# Patient Record
Sex: MALE | Race: WHITE | Employment: FULL TIME | URBAN - METROPOLITAN AREA
[De-identification: names, ages, dates, MRNs, and addresses within clinical notes are randomized per-mention and may not be internally consistent; named-entity substitution may affect disease eponyms.]

---

## 2022-07-29 ENCOUNTER — HOSPITAL ENCOUNTER (INPATIENT)
Age: 52
LOS: 3 days | Discharge: HOME | DRG: 042 | End: 2022-08-01
Attending: EMERGENCY MEDICINE | Admitting: PSYCHIATRY & NEUROLOGY
Payer: COMMERCIAL

## 2022-07-29 ENCOUNTER — APPOINTMENT (OUTPATIENT)
Dept: CT IMAGING | Age: 52
End: 2022-07-29
Payer: COMMERCIAL

## 2022-07-29 ENCOUNTER — OFFICE VISIT (OUTPATIENT)
Dept: PRIMARY CARE CLINIC | Age: 52
End: 2022-07-29

## 2022-07-29 ENCOUNTER — APPOINTMENT (OUTPATIENT)
Dept: GENERAL RADIOLOGY | Age: 52
End: 2022-07-29
Payer: COMMERCIAL

## 2022-07-29 ENCOUNTER — HOSPITAL ENCOUNTER (EMERGENCY)
Age: 52
Discharge: ANOTHER HOSPITAL | End: 2022-07-29
Attending: EMERGENCY MEDICINE
Payer: COMMERCIAL

## 2022-07-29 VITALS
TEMPERATURE: 98.1 F | RESPIRATION RATE: 18 BRPM | DIASTOLIC BLOOD PRESSURE: 100 MMHG | SYSTOLIC BLOOD PRESSURE: 178 MMHG | OXYGEN SATURATION: 97 % | HEART RATE: 54 BPM

## 2022-07-29 VITALS
RESPIRATION RATE: 9 BRPM | SYSTOLIC BLOOD PRESSURE: 184 MMHG | HEART RATE: 53 BPM | DIASTOLIC BLOOD PRESSURE: 89 MMHG | OXYGEN SATURATION: 97 % | TEMPERATURE: 97.6 F

## 2022-07-29 DIAGNOSIS — R20.0 NUMBNESS: Primary | ICD-10-CM

## 2022-07-29 DIAGNOSIS — I63.9 CEREBROVASCULAR ACCIDENT (CVA), UNSPECIFIED MECHANISM (HCC): Primary | ICD-10-CM

## 2022-07-29 DIAGNOSIS — R53.1 ACUTE LEFT-SIDED WEAKNESS: ICD-10-CM

## 2022-07-29 DIAGNOSIS — R29.810 FACIAL DROOP: ICD-10-CM

## 2022-07-29 DIAGNOSIS — R13.10 DYSPHAGIA, UNSPECIFIED TYPE: Primary | ICD-10-CM

## 2022-07-29 PROBLEM — R29.90 STROKE-LIKE SYMPTOMS: Status: ACTIVE | Noted: 2022-07-29

## 2022-07-29 LAB
ABSOLUTE EOS #: 0.09 K/UL (ref 0–0.44)
ABSOLUTE IMMATURE GRANULOCYTE: 0.03 K/UL (ref 0–0.3)
ABSOLUTE LYMPH #: 2.17 K/UL (ref 1.1–3.7)
ABSOLUTE MONO #: 0.63 K/UL (ref 0.1–1.2)
ANION GAP SERPL CALCULATED.3IONS-SCNC: 11 MMOL/L (ref 9–17)
BASOPHILS # BLD: 1 % (ref 0–2)
BASOPHILS ABSOLUTE: 0.04 K/UL (ref 0–0.2)
BUN BLDV-MCNC: 9 MG/DL (ref 6–20)
BUN/CREAT BLD: 11 (ref 9–20)
CALCIUM SERPL-MCNC: 8.9 MG/DL (ref 8.6–10.4)
CHLORIDE BLD-SCNC: 101 MMOL/L (ref 98–107)
CO2: 24 MMOL/L (ref 20–31)
CREAT SERPL-MCNC: 0.82 MG/DL (ref 0.7–1.2)
EKG ATRIAL RATE: 49 BPM
EKG P AXIS: 62 DEGREES
EKG P-R INTERVAL: 184 MS
EKG Q-T INTERVAL: 456 MS
EKG QRS DURATION: 94 MS
EKG QTC CALCULATION (BAZETT): 411 MS
EKG R AXIS: 16 DEGREES
EKG T AXIS: 30 DEGREES
EKG VENTRICULAR RATE: 49 BPM
EOSINOPHILS RELATIVE PERCENT: 1 % (ref 1–4)
GFR AFRICAN AMERICAN: >60 ML/MIN
GFR NON-AFRICAN AMERICAN: >60 ML/MIN
GFR SERPL CREATININE-BSD FRML MDRD: ABNORMAL ML/MIN/{1.73_M2}
GLUCOSE BLD-MCNC: 140 MG/DL (ref 70–99)
HCT VFR BLD CALC: 41.1 % (ref 40.7–50.3)
HEMOGLOBIN: 14.5 G/DL (ref 13–17)
IMMATURE GRANULOCYTES: 0 %
LYMPHOCYTES # BLD: 28 % (ref 24–43)
MCH RBC QN AUTO: 31.6 PG (ref 25.2–33.5)
MCHC RBC AUTO-ENTMCNC: 35.3 G/DL (ref 25.2–33.5)
MCV RBC AUTO: 89.5 FL (ref 82.6–102.9)
MONOCYTES # BLD: 8 % (ref 3–12)
NRBC AUTOMATED: 0 PER 100 WBC
PDW BLD-RTO: 12.8 % (ref 11.8–14.4)
PLATELET # BLD: 256 K/UL (ref 138–453)
PMV BLD AUTO: 8.8 FL (ref 8.1–13.5)
POTASSIUM SERPL-SCNC: 3.8 MMOL/L (ref 3.7–5.3)
RBC # BLD: 4.59 M/UL (ref 4.21–5.77)
SARS-COV-2, RAPID: NOT DETECTED
SEG NEUTROPHILS: 62 % (ref 36–65)
SEGMENTED NEUTROPHILS ABSOLUTE COUNT: 4.74 K/UL (ref 1.5–8.1)
SODIUM BLD-SCNC: 136 MMOL/L (ref 135–144)
SPECIMEN DESCRIPTION: NORMAL
TROPONIN, HIGH SENSITIVITY: <6 NG/L (ref 0–22)
WBC # BLD: 7.7 K/UL (ref 3.5–11.3)

## 2022-07-29 PROCEDURE — 84484 ASSAY OF TROPONIN QUANT: CPT

## 2022-07-29 PROCEDURE — 80048 BASIC METABOLIC PNL TOTAL CA: CPT

## 2022-07-29 PROCEDURE — 6370000000 HC RX 637 (ALT 250 FOR IP): Performed by: EMERGENCY MEDICINE

## 2022-07-29 PROCEDURE — 99285 EMERGENCY DEPT VISIT HI MDM: CPT

## 2022-07-29 PROCEDURE — 70450 CT HEAD/BRAIN W/O DYE: CPT

## 2022-07-29 PROCEDURE — 71046 X-RAY EXAM CHEST 2 VIEWS: CPT

## 2022-07-29 PROCEDURE — 93005 ELECTROCARDIOGRAM TRACING: CPT | Performed by: EMERGENCY MEDICINE

## 2022-07-29 PROCEDURE — 85025 COMPLETE CBC W/AUTO DIFF WBC: CPT

## 2022-07-29 PROCEDURE — 99999 PR OFFICE/OUTPT VISIT,PROCEDURE ONLY: CPT | Performed by: FAMILY MEDICINE

## 2022-07-29 PROCEDURE — 87635 SARS-COV-2 COVID-19 AMP PRB: CPT

## 2022-07-29 PROCEDURE — 2060000000 HC ICU INTERMEDIATE R&B

## 2022-07-29 PROCEDURE — 6360000004 HC RX CONTRAST MEDICATION: Performed by: EMERGENCY MEDICINE

## 2022-07-29 PROCEDURE — 70498 CT ANGIOGRAPHY NECK: CPT

## 2022-07-29 RX ORDER — CLOPIDOGREL BISULFATE 75 MG/1
75 TABLET ORAL DAILY
Status: DISCONTINUED | OUTPATIENT
Start: 2022-07-30 | End: 2022-08-01 | Stop reason: HOSPADM

## 2022-07-29 RX ORDER — CLOPIDOGREL BISULFATE 75 MG/1
300 TABLET ORAL ONCE
Status: COMPLETED | OUTPATIENT
Start: 2022-07-29 | End: 2022-07-29

## 2022-07-29 RX ORDER — ASPIRIN 81 MG/1
324 TABLET, CHEWABLE ORAL ONCE
Status: COMPLETED | OUTPATIENT
Start: 2022-07-29 | End: 2022-07-29

## 2022-07-29 RX ORDER — METOPROLOL SUCCINATE 50 MG/1
50 TABLET, EXTENDED RELEASE ORAL DAILY
COMMUNITY

## 2022-07-29 RX ORDER — CLOPIDOGREL BISULFATE 75 MG/1
75 TABLET ORAL DAILY
Status: DISCONTINUED | OUTPATIENT
Start: 2022-07-30 | End: 2022-07-29

## 2022-07-29 RX ADMIN — CLOPIDOGREL BISULFATE 300 MG: 75 TABLET ORAL at 18:14

## 2022-07-29 RX ADMIN — IOPAMIDOL 75 ML: 755 INJECTION, SOLUTION INTRAVENOUS at 17:01

## 2022-07-29 RX ADMIN — ASPIRIN 81 MG 324 MG: 81 TABLET ORAL at 18:13

## 2022-07-29 ASSESSMENT — PATIENT HEALTH QUESTIONNAIRE - PHQ9
SUM OF ALL RESPONSES TO PHQ9 QUESTIONS 1 & 2: 0
SUM OF ALL RESPONSES TO PHQ QUESTIONS 1-9: 0
2. FEELING DOWN, DEPRESSED OR HOPELESS: 0
1. LITTLE INTEREST OR PLEASURE IN DOING THINGS: 0

## 2022-07-29 ASSESSMENT — ENCOUNTER SYMPTOMS
RHINORRHEA: 0
DIARRHEA: 0
NAUSEA: 0
PHOTOPHOBIA: 0
SHORTNESS OF BREATH: 0
ABDOMINAL PAIN: 0
ABDOMINAL PAIN: 0
SORE THROAT: 1
NAUSEA: 0
CONSTIPATION: 0
SHORTNESS OF BREATH: 0
TROUBLE SWALLOWING: 1
VOMITING: 0
BACK PAIN: 0
VOMITING: 0
COUGH: 0
DIARRHEA: 0

## 2022-07-29 ASSESSMENT — PAIN DESCRIPTION - DESCRIPTORS: DESCRIPTORS: SORE

## 2022-07-29 ASSESSMENT — PAIN DESCRIPTION - LOCATION: LOCATION: THROAT

## 2022-07-29 ASSESSMENT — PAIN - FUNCTIONAL ASSESSMENT: PAIN_FUNCTIONAL_ASSESSMENT: WONG-BAKER FACES

## 2022-07-29 ASSESSMENT — PAIN SCALES - GENERAL: PAINLEVEL_OUTOF10: 2

## 2022-07-29 NOTE — ED PROVIDER NOTES
[facility] ED  [address]  [place] [address]  Phone: [phone]        Pt Name: Elaina Alcaraz  MRN: [id number]  [place] 1970  Date of evaluation: 7/29/22      CHIEF COMPLAINT     Chief Complaint   Patient presents with    Numbness     Left leg numbness since last night. Difficulty walking. Facial Droop     Right sided facial droop \"squinted up more\" since last night. HISTORY OF PRESENT ILLNESS  (Location/Symptom, Timing/Onset, Context/Setting, Quality, Duration, Modifying Factors, Severity.)    Elaina Alcaraz is a 46 y.o. male who presents with left leg and arm numbness, sore throat, and difficulty swallowing. No weakness. No dysarthria or word-finding problems. Time of onset 5:00 PM last night. Extensive family history of stroke. REVIEW OF SYSTEMS    (2-9 systems for level 4, 10 or more for level 5)     Review of Systems   Constitutional:  Negative for chills and fever. HENT:  Positive for sore throat. Negative for congestion and rhinorrhea. Eyes:  Negative for photophobia and visual disturbance. Respiratory:  Negative for cough and shortness of breath. Cardiovascular:  Negative for chest pain and palpitations. Gastrointestinal:  Negative for abdominal pain, constipation, diarrhea, nausea and vomiting. Genitourinary:  Negative for dysuria, frequency and urgency. Musculoskeletal:  Negative for back pain and neck pain. Skin:  Negative for rash and wound. Neurological:  Negative for dizziness, light-headedness and headaches. Hematological:  Negative for adenopathy. Does not bruise/bleed easily. PAST MEDICAL HISTORY    has a past medical history of Hearing loss, Hypertension, and Sleep apnea. SURGICAL HISTORY      has a past surgical history that includes Tonsillectomy (2004) and hernia repair (1973).     CURRENTMEDICATIONS       Previous Medications    METOPROLOL SUCCINATE (TOPROL XL) 50 MG EXTENDED RELEASE TABLET    Take 50 mg by mouth in the morning. ALLERGIES     has No Known Allergies. FAMILY HISTORY     has no family status information on file. family history is not on file. SOCIAL HISTORY      reports that he has never smoked. He has never used smokeless tobacco. He reports current alcohol use of about 3.0 standard drinks per week. He reports that he does not use drugs. PHYSICAL EXAM    (up to 7 for level 4, 8 or more for level 5)   INITIAL VITALS:  tympanic temperature is 97.6 °F (36.4 °C). His blood pressure is 188/104 (abnormal) and his pulse is 49 (abnormal). His respiration is 15 and oxygen saturation is 99%. Physical Exam  Vitals and nursing note reviewed. Constitutional:       Appearance: Normal appearance. HENT:      Head: Normocephalic and atraumatic. Eyes:      Extraocular Movements: Extraocular movements intact. Pupils: Pupils are equal, round, and reactive to light. Cardiovascular:      Rate and Rhythm: Regular rhythm. Bradycardia present. Pulses: Normal pulses. Heart sounds: Normal heart sounds. No murmur heard. No friction rub. No gallop. Pulmonary:      Effort: Pulmonary effort is normal.      Breath sounds: Normal breath sounds. No wheezing, rhonchi or rales. Abdominal:      General: Abdomen is flat. Bowel sounds are normal.      Palpations: Abdomen is soft. Tenderness: There is no abdominal tenderness. There is no guarding or rebound. Musculoskeletal:         General: No tenderness. Normal range of motion. Cervical back: Normal range of motion and neck supple. Right lower leg: No edema. Left lower leg: No edema. Skin:     General: Skin is warm and dry. Capillary Refill: Capillary refill takes less than 2 seconds. Neurological:      Mental Status: He is alert and oriented to person, place, and time. Mental status is at baseline.    Psychiatric:         Mood and Affect: Mood normal.         Behavior: Behavior normal.       DIFFERENTIAL DIAGNOSIS/ MDM:     INITIAL NIH STROKE SCALE    Time Performed:  4:50 PM     1a. Level of consciousness:  0 - alert; keenly responsive  1b. Level of consciousness questions:  0 - answers both questions correctly  1c. Level of consciousness questions:  0 - performs both tasks correctly  2. Best Gaze:  0 - normal  3. Visual:  0 - no visual loss  4. Facial Palsy:  1 - minor paralysis (flattened nasolabial fold, asymmetric on smiling)  5a. Motor left arm:  0 - no drift, limb holds 90 (or 45) degrees for full 10 seconds  5b. Motor right arm:  0 - no drift, limb holds 90 (or 45) degrees for full 10 seconds  6a. Motor left le - no drift; leg holds 30 degree position for full 5 seconds  6b. Motor right le - no drift; leg holds 30 degree position for full 5 seconds  7. Limb Ataxia:  0 - absent  8. Sensory:  0 - normal; no sensory loss  9. Best Language:  0 - no aphasia, normal  10. Dysarthria:  0 - normal  11.   Extinction and Inattention:  0 - no abnormality    TOTAL:  1        DIAGNOSTIC RESULTS     EKG: All EKG's are interpreted by the Emergency Department Physician who either signs or Co-signs this chart in the absence of a cardiologist.    EKG Interpretation    Interpreted by emergency department physician    Rhythm: sinus bradycardia  Rate: bradycardia and 40-50  Axis: normal  Ectopy: none  Conduction: normal  ST Segments: normal  T Waves: normal  Q Waves: none    Clinical Impression: sinus bradycardia    Edy Mcarthur MD      RADIOLOGY:        Interpretation per the Radiologist below, if available at the time of this note:    CT HEAD WO CONTRAST    Result Date: 2022  EXAMINATION: CT OF THE HEAD WITHOUT CONTRAST; CTA OF THE HEAD AND NECK WITH CONTRAST 2022 4:56 pm; 2022 5:00 pm: TECHNIQUE: CT of the head was performed without the administration of intravenous contrast. Automated exposure control, iterative reconstruction, and/or weight based adjustment of the mA/kV was utilized to reduce the radiation dose to as low as reasonably achievable.; CTA of the head and neck was performed with the administration of intravenous contrast. Multiplanar reformatted images are provided for review. MIP images are provided for review. Stenosis of the internal carotid arteries measured using NASCET criteria. Automated exposure control, iterative reconstruction, and/or weight based adjustment of the mA/kV was utilized to reduce the radiation dose to as low as reasonably achievable. Noncontrast CT of the head with reconstructed 2-D images are also provided for review. COMPARISON: None. HISTORY: ORDERING SYSTEM PROVIDED HISTORY: left side numbness TECHNOLOGIST PROVIDED HISTORY: left side numbness Decision Support Exception - unselect if not a suspected or confirmed emergency medical condition->Emergency Medical Condition (MA) Reason for Exam: left side numbness; ORDERING SYSTEM PROVIDED HISTORY: left side numbness TECHNOLOGIST PROVIDED HISTORY: left side numbness Decision Support Exception - unselect if not a suspected or confirmed emergency medical condition->Emergency Medical Condition (MA) Reason for Exam: right facial droop, left side numbness FINDINGS: CT HEAD: BRAIN/VENTRICLES:  No acute intracranial hemorrhage or extraaxial fluid collection. Grey-white differentiation is maintained. No evidence of mass, mass effect or midline shift. No evidence of hydrocephalus. ORBITS: The visualized portion of the orbits demonstrate no acute abnormality. SINUSES:  The visualized paranasal sinuses and mastoid air cells demonstrate no acute abnormality. SOFT TISSUES/SKULL: No acute abnormality of the visualized skull or soft tissues. CTA NECK: AORTIC ARCH/ARCH VESSELS: No dissection or arterial injury. No significant stenosis of the brachiocephalic or subclavian arteries. CAROTID ARTERIES: No dissection, arterial injury, or hemodynamically significant stenosis by NASCET criteria.  VERTEBRAL ARTERIES: No dissection, arterial injury, or significant stenosis. SOFT TISSUES: Medialization of the right vocal cord could indicate paralysis. There is a mildly enlarged left submental lymph node measuring 13 x 10 mm. No primary mass is identified in the neck. BONES: No acute osseous abnormality. CTA HEAD: ANTERIOR CIRCULATION: No significant stenosis of the intracranial internal carotid, anterior cerebral, or middle cerebral arteries. No aneurysm. POSTERIOR CIRCULATION: No significant stenosis of the vertebral, basilar, or posterior cerebral arteries. No aneurysm. OTHER: No dural venous sinus thrombosis on this non-dedicated study. 1. No acute intracranial abnormality on noncontrast CT. 2. No large vessel occlusion in the head or neck. 3. Right vocal cord medialization could indicate paralysis. 4. Nonspecific mildly enlarged left submental lymph node. Critical results on the noncontrast CT were called by Dr. Jazzy Meyer MD to Gypsy Haley on 7/29/2022 at 17:18. CTA HEAD NECK W CONTRAST    Result Date: 7/29/2022  EXAMINATION: CT OF THE HEAD WITHOUT CONTRAST; CTA OF THE HEAD AND NECK WITH CONTRAST 7/29/2022 4:56 pm; 7/29/2022 5:00 pm: TECHNIQUE: CT of the head was performed without the administration of intravenous contrast. Automated exposure control, iterative reconstruction, and/or weight based adjustment of the mA/kV was utilized to reduce the radiation dose to as low as reasonably achievable.; CTA of the head and neck was performed with the administration of intravenous contrast. Multiplanar reformatted images are provided for review. MIP images are provided for review. Stenosis of the internal carotid arteries measured using NASCET criteria. Automated exposure control, iterative reconstruction, and/or weight based adjustment of the mA/kV was utilized to reduce the radiation dose to as low as reasonably achievable. Noncontrast CT of the head with reconstructed 2-D images are also provided for review. COMPARISON: None. HISTORY: ORDERING SYSTEM PROVIDED HISTORY: left side numbness TECHNOLOGIST PROVIDED HISTORY: left side numbness Decision Support Exception - unselect if not a suspected or confirmed emergency medical condition->Emergency Medical Condition (MA) Reason for Exam: left side numbness; ORDERING SYSTEM PROVIDED HISTORY: left side numbness TECHNOLOGIST PROVIDED HISTORY: left side numbness Decision Support Exception - unselect if not a suspected or confirmed emergency medical condition->Emergency Medical Condition (MA) Reason for Exam: right facial droop, left side numbness FINDINGS: CT HEAD: BRAIN/VENTRICLES:  No acute intracranial hemorrhage or extraaxial fluid collection. Grey-white differentiation is maintained. No evidence of mass, mass effect or midline shift. No evidence of hydrocephalus. ORBITS: The visualized portion of the orbits demonstrate no acute abnormality. SINUSES:  The visualized paranasal sinuses and mastoid air cells demonstrate no acute abnormality. SOFT TISSUES/SKULL: No acute abnormality of the visualized skull or soft tissues. CTA NECK: AORTIC ARCH/ARCH VESSELS: No dissection or arterial injury. No significant stenosis of the brachiocephalic or subclavian arteries. CAROTID ARTERIES: No dissection, arterial injury, or hemodynamically significant stenosis by NASCET criteria. VERTEBRAL ARTERIES: No dissection, arterial injury, or significant stenosis. SOFT TISSUES: Medialization of the right vocal cord could indicate paralysis. There is a mildly enlarged left submental lymph node measuring 13 x 10 mm. No primary mass is identified in the neck. BONES: No acute osseous abnormality. CTA HEAD: ANTERIOR CIRCULATION: No significant stenosis of the intracranial internal carotid, anterior cerebral, or middle cerebral arteries. No aneurysm. POSTERIOR CIRCULATION: No significant stenosis of the vertebral, basilar, or posterior cerebral arteries. No aneurysm.  OTHER: No dural venous sinus thrombosis on this non-dedicated study. 1. No acute intracranial abnormality on noncontrast CT. 2. No large vessel occlusion in the head or neck. 3. Right vocal cord medialization could indicate paralysis. 4. Nonspecific mildly enlarged left submental lymph node. Critical results on the noncontrast CT were called by Dr. Jaime Birmingham MD to Caity Neal on 7/29/2022 at 17:18. LABS:  Results for orders placed or performed during the hospital encounter of 07/29/22   COVID-19, Rapid    Specimen: Nasopharyngeal Swab   Result Value Ref Range    Specimen Description . NASOPHARYNGEAL SWAB     SARS-CoV-2, Rapid Not Detected Not Detected   Basic Metabolic Panel   Result Value Ref Range    Glucose 140 (H) 70 - 99 mg/dL    BUN 9 6 - 20 mg/dL    Creatinine 0.82 0.70 - 1.20 mg/dL    Bun/Cre Ratio 11 9 - 20    Calcium 8.9 8.6 - 10.4 mg/dL    Sodium 136 135 - 144 mmol/L    Potassium 3.8 3.7 - 5.3 mmol/L    Chloride 101 98 - 107 mmol/L    CO2 24 20 - 31 mmol/L    Anion Gap 11 9 - 17 mmol/L    GFR Non-African American >60 >60 mL/min    GFR African American >60 >60 mL/min    GFR Comment         CBC with Auto Differential   Result Value Ref Range    WBC 7.7 3.5 - 11.3 k/uL    RBC 4.59 4.21 - 5.77 m/uL    Hemoglobin 14.5 13.0 - 17.0 g/dL    Hematocrit 41.1 40.7 - 50.3 %    MCV 89.5 82.6 - 102.9 fL    MCH 31.6 25.2 - 33.5 pg    MCHC 35.3 (H) 25.2 - 33.5 g/dL    RDW 12.8 11.8 - 14.4 %    Platelets 604 137 - 166 k/uL    MPV 8.8 8.1 - 13.5 fL    NRBC Automated 0.0 0.0 per 100 WBC    Seg Neutrophils 62 36 - 65 %    Lymphocytes 28 24 - 43 %    Monocytes 8 3 - 12 %    Eosinophils % 1 1 - 4 %    Basophils 1 0 - 2 %    Immature Granulocytes 0 0 %    Segs Absolute 4.74 1.50 - 8.10 k/uL    Absolute Lymph # 2.17 1.10 - 3.70 k/uL    Absolute Mono # 0.63 0.10 - 1.20 k/uL    Absolute Eos # 0.09 0.00 - 0.44 k/uL    Basophils Absolute 0.04 0.00 - 0.20 k/uL    Absolute Immature Granulocyte 0.03 0.00 - 0.30 k/uL   Troponin   Result Value Ref Range    Troponin, High Sensitivity <6 0 - 22 ng/L       Negative troponin    EMERGENCY DEPARTMENT COURSE:   Vitals:    Vitals:    07/29/22 1643 07/29/22 1645 07/29/22 1721   BP: (!) 201/103 (!) 202/113 (!) 188/104   Pulse: 53  (!) 49   Resp: 16  15   Temp: 97.6 °F (36.4 °C)     TempSrc: Tympanic     SpO2: 98% 95% 99%     -------------------------  BP: (!) 188/104, Temp: 97.6 °F (36.4 °C), Heart Rate: (!) 49, Resp: 15      RE-EVALUATION:  Patient updated on test results and recommendations of Dr Katelyn Veras from Stroke Neurology from [facility]. Patient is agreeable to transfer. CONSULTS:  Stroke Neurology  I discussed the patient with Dr Katelyn Veras. No TPA as the patient is out of the window. Load with aspirin and plavix. Permissive hypertension with SBP <220. Transfer to [facility] for further evaluation. Concern for medullary stroke. PROCEDURES:  None    FINAL IMPRESSION      1. Cerebrovascular accident (CVA), unspecified mechanism ([address]          DISPOSITION/PLAN   DISPOSITION Decision To Transfer 07/29/2022 05:40:03 PM      CONDITION ON DISPOSITION:   Stable     PATIENT REFERRED TO:  No follow-up provider specified.     DISCHARGE MEDICATIONS:  New Prescriptions    No medications on file       (Please note that portions of this note were completed with a voicerecognition program.  Efforts were made to edit the dictations but occasionally words are mis-transcribed.)    Jake Dumont MD  Attending Emergency Medicine Physician        Hannah Mtz MD  07/29/22 62 [place] Won MD  08/01/22 [id number]

## 2022-07-29 NOTE — PROGRESS NOTES
Patient with left sided weakness and numbness and elevated blood pressure. Right upper eye droop. Checked in with complaint of sore throat, but after discussion, I don't think he has a sore throat as much as he reports difficulty with swallowing? Due to concern regarding possible stroke, I do think it is best to send patient to the ER for further evaluation and care. Patient taken to the ER via wheelchair after report called to the ER. ....

## 2022-07-30 ENCOUNTER — APPOINTMENT (OUTPATIENT)
Dept: MRI IMAGING | Age: 52
DRG: 042 | End: 2022-07-30
Payer: COMMERCIAL

## 2022-07-30 PROBLEM — R20.0 NUMBNESS: Status: ACTIVE | Noted: 2022-07-30

## 2022-07-30 PROBLEM — G46.4 LATERAL MEDULLARY SYNDROME: Status: ACTIVE | Noted: 2022-07-30

## 2022-07-30 PROBLEM — I63.541 OCCLUSION OF RIGHT POSTERIOR INFERIOR CEREBELLAR ARTERY WITH INFARCTION (HCC): Status: ACTIVE | Noted: 2022-07-30

## 2022-07-30 LAB
ANION GAP SERPL CALCULATED.3IONS-SCNC: 11 MMOL/L (ref 9–17)
BUN BLDV-MCNC: 7 MG/DL (ref 6–20)
CALCIUM SERPL-MCNC: 8.8 MG/DL (ref 8.6–10.4)
CHLORIDE BLD-SCNC: 103 MMOL/L (ref 98–107)
CHOLESTEROL/HDL RATIO: 2.7
CHOLESTEROL: 166 MG/DL
CO2: 23 MMOL/L (ref 20–31)
CREAT SERPL-MCNC: 0.83 MG/DL (ref 0.7–1.2)
GFR AFRICAN AMERICAN: >60 ML/MIN
GFR NON-AFRICAN AMERICAN: >60 ML/MIN
GFR SERPL CREATININE-BSD FRML MDRD: ABNORMAL ML/MIN/{1.73_M2}
GLUCOSE BLD-MCNC: 127 MG/DL (ref 70–99)
HCT VFR BLD CALC: 39.7 % (ref 40.7–50.3)
HDLC SERPL-MCNC: 62 MG/DL
HEMOGLOBIN: 13.8 G/DL (ref 13–17)
LDL CHOLESTEROL: 87 MG/DL (ref 0–130)
MCH RBC QN AUTO: 32.2 PG (ref 25.2–33.5)
MCHC RBC AUTO-ENTMCNC: 34.8 G/DL (ref 28.4–34.8)
MCV RBC AUTO: 92.5 FL (ref 82.6–102.9)
NRBC AUTOMATED: 0 PER 100 WBC
PDW BLD-RTO: 12.8 % (ref 11.8–14.4)
PLATELET # BLD: 238 K/UL (ref 138–453)
PMV BLD AUTO: 9 FL (ref 8.1–13.5)
POTASSIUM SERPL-SCNC: 3.6 MMOL/L (ref 3.7–5.3)
RBC # BLD: 4.29 M/UL (ref 4.21–5.77)
SODIUM BLD-SCNC: 137 MMOL/L (ref 135–144)
TRIGL SERPL-MCNC: 85 MG/DL
TSH SERPL DL<=0.05 MIU/L-ACNC: 1.75 UIU/ML (ref 0.3–5)
WBC # BLD: 8.8 K/UL (ref 3.5–11.3)

## 2022-07-30 PROCEDURE — 84443 ASSAY THYROID STIM HORMONE: CPT

## 2022-07-30 PROCEDURE — 6360000002 HC RX W HCPCS

## 2022-07-30 PROCEDURE — 92610 EVALUATE SWALLOWING FUNCTION: CPT

## 2022-07-30 PROCEDURE — 6370000000 HC RX 637 (ALT 250 FOR IP): Performed by: STUDENT IN AN ORGANIZED HEALTH CARE EDUCATION/TRAINING PROGRAM

## 2022-07-30 PROCEDURE — 99223 1ST HOSP IP/OBS HIGH 75: CPT | Performed by: PSYCHIATRY & NEUROLOGY

## 2022-07-30 PROCEDURE — 80061 LIPID PANEL: CPT

## 2022-07-30 PROCEDURE — 6370000000 HC RX 637 (ALT 250 FOR IP)

## 2022-07-30 PROCEDURE — 2060000000 HC ICU INTERMEDIATE R&B

## 2022-07-30 PROCEDURE — 36415 COLL VENOUS BLD VENIPUNCTURE: CPT

## 2022-07-30 PROCEDURE — 92523 SPEECH SOUND LANG COMPREHEN: CPT

## 2022-07-30 PROCEDURE — 80048 BASIC METABOLIC PNL TOTAL CA: CPT

## 2022-07-30 PROCEDURE — 6360000002 HC RX W HCPCS: Performed by: STUDENT IN AN ORGANIZED HEALTH CARE EDUCATION/TRAINING PROGRAM

## 2022-07-30 PROCEDURE — 83036 HEMOGLOBIN GLYCOSYLATED A1C: CPT

## 2022-07-30 PROCEDURE — 70551 MRI BRAIN STEM W/O DYE: CPT

## 2022-07-30 PROCEDURE — 85027 COMPLETE CBC AUTOMATED: CPT

## 2022-07-30 RX ORDER — LABETALOL HYDROCHLORIDE 5 MG/ML
10 INJECTION, SOLUTION INTRAVENOUS EVERY 10 MIN PRN
Status: DISCONTINUED | OUTPATIENT
Start: 2022-07-30 | End: 2022-08-01 | Stop reason: HOSPADM

## 2022-07-30 RX ORDER — ENOXAPARIN SODIUM 100 MG/ML
40 INJECTION SUBCUTANEOUS DAILY
Status: DISCONTINUED | OUTPATIENT
Start: 2022-07-30 | End: 2022-08-01 | Stop reason: HOSPADM

## 2022-07-30 RX ORDER — ONDANSETRON 2 MG/ML
4 INJECTION INTRAMUSCULAR; INTRAVENOUS EVERY 6 HOURS PRN
Status: DISCONTINUED | OUTPATIENT
Start: 2022-07-30 | End: 2022-08-01 | Stop reason: HOSPADM

## 2022-07-30 RX ORDER — POLYETHYLENE GLYCOL 3350 17 G/17G
17 POWDER, FOR SOLUTION ORAL DAILY PRN
Status: DISCONTINUED | OUTPATIENT
Start: 2022-07-30 | End: 2022-08-01 | Stop reason: HOSPADM

## 2022-07-30 RX ORDER — ATORVASTATIN CALCIUM 80 MG/1
80 TABLET, FILM COATED ORAL NIGHTLY
Status: DISCONTINUED | OUTPATIENT
Start: 2022-07-30 | End: 2022-08-01 | Stop reason: HOSPADM

## 2022-07-30 RX ORDER — ACETAMINOPHEN 325 MG/1
650 TABLET ORAL EVERY 4 HOURS PRN
Status: DISCONTINUED | OUTPATIENT
Start: 2022-07-30 | End: 2022-08-01 | Stop reason: HOSPADM

## 2022-07-30 RX ORDER — MAGNESIUM SULFATE IN WATER 40 MG/ML
2000 INJECTION, SOLUTION INTRAVENOUS ONCE
Status: COMPLETED | OUTPATIENT
Start: 2022-07-30 | End: 2022-07-30

## 2022-07-30 RX ORDER — ONDANSETRON 4 MG/1
4 TABLET, ORALLY DISINTEGRATING ORAL EVERY 8 HOURS PRN
Status: DISCONTINUED | OUTPATIENT
Start: 2022-07-30 | End: 2022-08-01 | Stop reason: HOSPADM

## 2022-07-30 RX ORDER — POTASSIUM CHLORIDE 7.45 MG/ML
10 INJECTION INTRAVENOUS
Status: DISCONTINUED | OUTPATIENT
Start: 2022-07-30 | End: 2022-07-30

## 2022-07-30 RX ORDER — ASPIRIN 300 MG/1
300 SUPPOSITORY RECTAL DAILY
Status: DISCONTINUED | OUTPATIENT
Start: 2022-07-30 | End: 2022-08-01 | Stop reason: HOSPADM

## 2022-07-30 RX ORDER — ASPIRIN 81 MG/1
81 TABLET ORAL DAILY
Status: DISCONTINUED | OUTPATIENT
Start: 2022-07-30 | End: 2022-08-01 | Stop reason: HOSPADM

## 2022-07-30 RX ORDER — POTASSIUM CHLORIDE 20 MEQ/1
40 TABLET, EXTENDED RELEASE ORAL ONCE
Status: COMPLETED | OUTPATIENT
Start: 2022-07-30 | End: 2022-07-30

## 2022-07-30 RX ADMIN — CLOPIDOGREL 75 MG: 75 TABLET, FILM COATED ORAL at 08:41

## 2022-07-30 RX ADMIN — POTASSIUM CHLORIDE 40 MEQ: 1500 TABLET, EXTENDED RELEASE ORAL at 08:42

## 2022-07-30 RX ADMIN — Medication 81 MG: at 08:41

## 2022-07-30 RX ADMIN — ATORVASTATIN CALCIUM 80 MG: 80 TABLET, FILM COATED ORAL at 21:36

## 2022-07-30 RX ADMIN — ATORVASTATIN CALCIUM 80 MG: 80 TABLET, FILM COATED ORAL at 03:04

## 2022-07-30 RX ADMIN — MAGNESIUM SULFATE HEPTAHYDRATE 2000 MG: 40 INJECTION, SOLUTION INTRAVENOUS at 18:51

## 2022-07-30 RX ADMIN — ACETAMINOPHEN 650 MG: 325 TABLET ORAL at 18:45

## 2022-07-30 RX ADMIN — ENOXAPARIN SODIUM 40 MG: 100 INJECTION SUBCUTANEOUS at 08:41

## 2022-07-30 ASSESSMENT — PAIN SCALES - GENERAL
PAINLEVEL_OUTOF10: 3
PAINLEVEL_OUTOF10: 0
PAINLEVEL_OUTOF10: 0

## 2022-07-30 ASSESSMENT — ENCOUNTER SYMPTOMS
CHOKING: 1
TROUBLE SWALLOWING: 1
SHORTNESS OF BREATH: 0

## 2022-07-30 ASSESSMENT — PAIN DESCRIPTION - LOCATION: LOCATION: HEAD

## 2022-07-30 NOTE — PROGRESS NOTES
Speech Language Pathology  Facility/Department: [facility] NEURO   CLINICAL BEDSIDE SWALLOW EVALUATION    NAME: Linwood Nevarez  : 1970  MRN: [id number]    ADMISSION DATE: 2022  ADMITTING DIAGNOSIS: has Stroke-like symptoms on their problem list.  ONSET DATE: 22    Recent Chest Xray/CT of Chest:   Impression   No acute cardiopulmonary disease       Date of Eval: 2022  Evaluating Therapist: HANG Radford    Current Diet level:  Current Diet : Regular    Primary Complaint   The patient is a 68-year-old male with past medical history of hypertension (on metoprolol 50 mg p.o. daily) who presents to the ED on 2022 as a transfer from [facility] (where he was initially referred to from an external urgent care facility) after presenting with complaints of acute onset dysphagia, slightly slurred speech, and left-sided numbness. Patient reports waking up around 2 AM due to choking on spit and subsequently having difficulty with swallowing water. He had previously gone to bed at 10:30 PM the night prior without any symptoms and at baseline. ST consulted as part of stroke workup. Pain:  Pain Assessment  Pain Assessment: None - Denies Pain  Pain Level: 0 (asleep resp even)    Reason for Referral  Linwood Nevarez was referred for a bedside swallow evaluation to assess the efficiency of his swallow function, identify signs and symptoms of aspiration and make recommendations regarding safe dietary consistencies, effective compensatory strategies, and safe eating environment. Impression  Oral mech exam and cranial nerve exam WFL. Vocal quality WFL. Mastication of cracker WFL , no significant residue , labial seal is adequate. Thin liquids tolerated well per cup and straw with timely swallow, without overt s/s of aspiration. Pureed textures tolerated well without overt s/s of aspiration. CXR is [facility]. Strong volitional cough.  Pt endorses having difficulties swallow Friday morning and

## 2022-07-30 NOTE — ED NOTES
45 yo [place] recent sore throat and difficulty swallowing, was not able to take his metoprolol pill, having right facial droop and left arm and leg weakness symptoms. Normal CT/CT angiogram.  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 Blood pressure initially 440O systolic, now 935.   Dr. Kylie Russell aware of patient and recommends loading with aspirin and Plavix, do not treat blood pressure unless over 220, transfer here for further evaluation  Accepted by Dr Lonny Black, RN  07/29/22 7002

## 2022-07-30 NOTE — PROGRESS NOTES
Speech Language Pathology  Facility/Department: [facility] NEURO  Initial Speech/Language/Cognitive Assessment    NAME: Julissa Tinajero  : 1970   MRN: [id number]  ADMISSION DATE: 2022  ADMITTING DIAGNOSIS: has Stroke-like symptoms on their problem list.  DATE ONSET: 22    Date of Eval: 2022   Evaluating Therapist: Catarina Krabbe, SLP    RECENT RESULTS  CT OF HEAD/MRI:  Impression   1. No acute intracranial abnormality on noncontrast CT. 2. No large vessel occlusion in the head or neck. 3. Right vocal cord medialization could indicate paralysis. 4. Nonspecific mildly enlarged left submental lymph node. Primary Complaint:   The patient is a 49-year-old male with past medical history of hypertension (on metoprolol 50 mg p.o. daily) who presents to the ED on 2022 as a transfer from [facility] (where he was initially referred to from an external urgent care facility) after presenting with complaints of acute onset dysphagia, slightly slurred speech, and left-sided numbness. Patient reports waking up around 2 AM due to choking on spit and subsequently having difficulty with swallowing water. He had previously gone to bed at 10:30 PM the night prior without any symptoms and at baseline. ST consulted as part of stroke workup. Pain:  Pain Assessment  Pain Assessment: None - Denies Pain  Pain Level: 0 (asleep resp even)    Assessment:      Diagnosis: Speech/language/cognition appear to be [facility]. Pt is able to express all wants/needs/ideas without difficulties. Divergent and convergent naming 100% independent, confrontational naming 100% independent. Follows directions 100% independently. Oriented x4. Able to recall recent events and biographical information. Completes verbal problem solving tasks 100% independently. Able to read the clock independently. He does report mild voice/speech deficits yesterday, however,this has since resolved.  Pt has no other concerns regarding speech/language/cognition. ST will sign off at this time. Recommendations:  Requires SLP Intervention: No         Subjective:  Pt Pleasant and cooperative. Sitting upright in bed upon ST arrival, just finished breakfast. He states his wife thought he was drunk last night because his voice/speech were abnormal, however, this has since resolved. Pt communicating well without any notable vocal deficits.      Vision  Vision: Within Functional Limits  Hearing  Hearing: Within functional limits      objective:     Oral/Motor  Oral Hygiene: Clean  Gag: No Impairment    Auditory Comprehension  Comprehension: Within Functional Limits    Expression  Primary Mode of Expression: Verbal    Verbal Expression  Verbal Expression: Within functional limits    Pragmatics/Social Functioning  Pragmatics: Within functional limits    Cognition:      Orientation  Overall Orientation Status: Within Normal Limits  Attention  Attention: Within Functional Limits  Memory  Memory: Within Functional Limits  Problem Solving  Problem Solving: Within Functional Limits  Numeric Reasoning  Numeric Reasoning: Within Functional Limits  Abstract Reasoning  Abstract Reasoning: Within Functional Limits  Safety/Judgment  Safety/Judgment: Within Functional Limits        Education:  Patient Education: Pt educated re speech/language/cognitive eval  Patient Education Response: Verbalizes understanding          Therapy Time:   Individual Concurrent Group Co-treatment   Time In 0927         Time Out 0936         Minutes 9            Timed Code Treatment Minutes: 9 Minutes    Catarina Krabbe CCC-SLP   Speech-Language Pathologist    7/30/2022 9:50 AM

## 2022-07-30 NOTE — ED PROVIDER NOTES
[facility]  ED  Emergency Department Encounter  Emergency Medicine Resident     Pt Name:Harley Gonzalez  MRN: [id number]  [place] 1970  Date of evaluation: 7/29/22  PCP:  No primary care provider on file. CHIEF COMPLAINT       No chief complaint on file. HISTORY OF PRESENT ILLNESS  (Location/Symptom, Timing/Onset, Context/Setting, Quality, Duration, Modifying Factors, Severity.)      Lori Vail is a 46 y.o. male who presents with difficulty swallowing. Past medical history for hearing loss, hypertension and sleep apnea. Patient states that yesterday night around 2 AM he woke and felt he had difficulty swallowing as well as left-sided numbness. Went to an urgent care today that recommend he go to the emergency department. Outlying facility they noticed a right-sided facial droop. Concern for acute CVA. CT scans negative. Patient transferred here for higher level of care as well as MRI and neurology consultation. Paramedics noticed a right constricted pupil compared to the left that is more dilated. Patient denies any eye pain. States never had a stroke before in the past.  Denies any one-sided weakness or slurred speech. It was noted that he had some voice change according to him and CT scan noted right vocal cord paralysis. Denies any tearing from the right eye, or sweating. PAST MEDICAL / SURGICAL / SOCIAL / FAMILY HISTORY      has a past medical history of Hearing loss, Hypertension, and Sleep apnea. has a past surgical history that includes Tonsillectomy (2004) and hernia repair (1973).       Social History     Socioeconomic History    Marital status:      Spouse name: Not on file    Number of children: Not on file    Years of education: Not on file    Highest education level: Not on file   Occupational History    Not on file   Tobacco Use    Smoking status: Never    Smokeless tobacco: Never   Vaping Use    Vaping Use: Never used   Substance and Sexual Activity    Alcohol use: Yes     Alcohol/week: 3.0 standard drinks     Types: 3 Glasses of wine per week    Drug use: Never    Sexual activity: Not on file   Other Topics Concern    Not on file   Social History Narrative    Not on file     Social Determinants of Health     Financial Resource Strain: Not on file   Food Insecurity: Not on file   Transportation Needs: Not on file   Physical Activity: Not on file   Stress: Not on file   Social Connections: Not on file   Intimate Partner Violence: Not on file   Housing Stability: Not on file       No family history on file. Allergies:  Patient has no known allergies. Home Medications:  Prior to Admission medications    Medication Sig Start Date End Date Taking? Authorizing Provider   metoprolol succinate (TOPROL XL) 50 MG extended release tablet Take 50 mg by mouth in the morning. Historical Provider, MD       REVIEW OF SYSTEMS    (2-9 systems for level 4, 10 or more for level 5)      Review of Systems   Constitutional:  Negative for chills and fever. HENT:  Positive for trouble swallowing. Negative for congestion and nosebleeds. Eyes:  Negative for visual disturbance. Respiratory:  Negative for shortness of breath. Cardiovascular:  Negative for chest pain. Gastrointestinal:  Negative for abdominal pain, diarrhea, nausea and vomiting. Musculoskeletal:  Negative for neck pain. Skin:  Negative for rash and wound. Neurological:  Positive for facial asymmetry and numbness. Negative for dizziness, seizures, syncope, speech difficulty, light-headedness and headaches. PHYSICAL EXAM   (up to 7 for level 4, 8 or more for level 5)      INITIAL VITALS:   BP (!) 193/106   Pulse 52   Temp 98.6 °F (37 °C) (Oral)   Resp 16   SpO2 98%     Physical Exam  Constitutional:       General: He is not in acute distress. Appearance: He is not ill-appearing, toxic-appearing or diaphoretic.    Eyes:      Extraocular Movements: Extraocular movements intact. Cardiovascular:      Rate and Rhythm: Regular rhythm. Bradycardia present. Heart sounds: No murmur heard. No friction rub. No gallop. Pulmonary:      Effort: No respiratory distress. Breath sounds: No stridor. No wheezing, rhonchi or rales. Chest:      Chest wall: No tenderness. Abdominal:      General: There is no distension. Palpations: There is no mass. Tenderness: There is no abdominal tenderness. There is no guarding or rebound. Hernia: No hernia is present. Musculoskeletal:         General: No swelling, tenderness or signs of injury. Skin:     Capillary Refill: Capillary refill takes less than 2 seconds. Coloration: Skin is not jaundiced or pale. Findings: No bruising, erythema, lesion or rash. Neurological:      Mental Status: He is oriented to person, place, and time. Cranial Nerves: Cranial nerve deficit present. Sensory: Sensory deficit present. Motor: No weakness. Coordination: Coordination normal.      Comments: Right-sided facial droop, left-sided numbness, right pupil is constricted compared to left, both equal and reactive to light, extraocular movements intact       DIFFERENTIAL  DIAGNOSIS     PLAN (LABS / IMAGING / EKG):  Orders Placed This Encounter   Procedures    Inpatient consult to Neurology       MEDICATIONS ORDERED:  No orders of the defined types were placed in this encounter. DDX: CVA, electrolyte abnormality, Jake syndrome, arrhythmia    DIAGNOSTIC RESULTS / EMERGENCY DEPARTMENT COURSE / MDM   LAB RESULTS:  No results found for this visit on 07/29/22. IMPRESSION: Laboratory testing performed at outlying facility was grossly unremarkable. No indication for repeat labs. RADIOLOGY:  No orders to display         EMERGENCY DEPARTMENT COURSE:  54-year-old male presents chief complaint of left-sided numbness as well as difficulty swallowing. Symptoms began approximately 2 AM last night.   CT CTA as well as labs performed [facility]. These were negative. Stroke alert was called at [facility] and neuro team from here evaluated electronically. They recommended transfer for higher level of care. On examination patient does have right-sided facial droop as well as left-sided numbness. Pupils are not equal but are reactive to light. Extraocular moods intact. Neurology consulted when patient arrived in emergency department. Patient is hypertensive but neurology wants to keep him on the higher side and do not treat blood pressure in the systolics greater than 848. Neurology will admit patient for further evaluation. No notes of EC Admission Criteria type on file. PROCEDURES:  N/a    CONSULTS:  IP CONSULT TO NEUROLOGY    CRITICAL CARE:  N/a         FINAL IMPRESSION      1. Numbness          DISPOSITION / PLAN     DISPOSITION Decision To Admit 07/29/2022 10:59:32 PM      PATIENT REFERRED TO:  No follow-up provider specified.     DISCHARGE MEDICATIONS:  New Prescriptions    No medications on file       Kaveh Chiang DO  Emergency Medicine Resident    (Please note that portions of thisnote were completed with a voice recognition program.  Efforts were made to edit the dictations but occasionally words are mis-transcribed.)        Braden Moreno DO  Resident  07/29/22 [id number]

## 2022-07-30 NOTE — CARE COORDINATION
07/30/22 0852   Service Assessment   Patient Orientation Alert and Oriented;Person;Place;Situation   Cognition Alert   History Provided By Child/Family; Spouse   Primary Caregiver Self   Support Systems Spouse/Significant Other;Children   Patient's Healthcare Decision Maker is: Patient Declined (Legal Next of Kin Remains as Decision Maker)   PCP Verified by CM Yes  ([facility] [place])   Last Visit to PCP Within last 3 months   Prior Functional Level Independent in ADLs/IADLs   Current Functional Level Independent in ADLs/IADLs   Can patient return to prior living arrangement Yes   Ability to make needs known: Good   Family able to assist with home care needs: No   Would you like for me to discuss the discharge plan with any other family members/significant others, and if so, who? Yes  Kai Gucci)   Social/Functional History   Lives With Spouse;Daughter; Son   Type of [address] One level   Bathroom Shower/Tub Tub/Shower unit   Bathroom Toilet Standard   Bathroom Accessibility Accessible   Receives Help From Family;Friend(s)   ADL Assistance Independent   Homemaking Assistance Independent   Homemaking Responsibilities No   Transfer Assistance Independent   Active  Yes   Mode of Transportation Car   Occupation Full time employment   Discharge Planning   Type of [address] Prior To Admission None   Potential Assistance Needed N/A   DME Ordered? No   Potential Assistance Purchasing Medications No   Patient expects to be discharged to: House   One/Two Story Residence One story   History of falls? 0   Services At/After Discharge   Transition of Care Consult (CM Consult) Discharge Planning   Services At/After Discharge None   Confirm Follow Up Transport Family   Condition of Participation: Discharge Planning   The Plan for Transition of Care is related to the following treatment goals:  To find out what is wrong and

## 2022-07-30 NOTE — PLAN OF CARE
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 Patient was seen and examined at bedside today on 7/30/2022. His symptoms have significantly improved, and patient is eating, and has no other focal deficits noted. Patient does have a strong family history of stroke, however does not have any prior history of heart attack or stroke. Patient is already on aspirin 81 mg, Plavix 75 mg, Lipitor 80 mg daily. Plan to continue management with PT/OT/SP. Cardiology was consulted for evaluation for cardiac embolic source, and possible event monitor implementation prior to discharge.

## 2022-07-30 NOTE — ED PROVIDER NOTES
[facility]  Emergency Department  Faculty Attestation     I performed a history and physical examination of the patient and discussed management with the resident. I reviewed the residents note and agree with the documented findings and plan of care. Any areas of disagreement are noted on the chart. I was personally present for the key portions of any procedures. I have documented in the chart those procedures where I was not present during the key portions. I have reviewed the emergency nurses triage note. I agree with the chief complaint, past medical history, past surgical history, allergies, medications, social and family history as documented unless otherwise noted below. For Physician Assistant/ Nurse Practitioner cases/documentation I have personally evaluated this patient and have completed at least one if not all key elements of the E/M (history, physical exam, and MDM). Additional findings are as noted. Primary Care Physician:  No primary care provider on file. Screenings:  [unfilled]    CHIEF COMPLAINT     No chief complaint on file. RECENT VITALS:    ,   ,  ,      LABS:  Labs Reviewed - No data to display    Radiology  No orders to display       CRITICAL CARE: There was a high probability of clinically significant/life threatening deterioration in this patient's condition which required my urgent intervention. Total critical care time was none minutes. This excludes any time for separately reportable procedures. 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 He has been having difficulty swallowing, subtle change in his voice, tingling feeling in his left palm and his left foot and right facial droop that is subtle. There are some right facial discomfort. No headache. No palpitations. No neck pain. No trauma. No vertigo or ataxia.   EMS now notes that his left pupil is larger than the right pupil. No diplopia or visual changes. CT brain was normal.  CT angiogram was negative. Of note they did find that his right vocal cord appeared abnormal/not functioning on the CT images. He had difficulty swallowing but is able to tolerate his saliva and that he was able to swallow the aspirin and Plavix. No history of diabetes. Dr Jeevan Whitney neuro interventional reviewed the case and recommends aspirin and Plavix and transferred here for further evaluation and care, recommends treatment of hypertension only if over 220. Patient does have bradycardia which is from his medications. On exam he appears comfortable, GCS is 15, slightly hypertensive and also bradycardic. No respiratory distress. GCS is 15. Left pupil is larger than the right, both reactive. Extraocular moods are full. There is very subtle right sided weakness at the nasolabial fold and with asymmetric smile. He is able to lift eyebrows and squeeze eyes symmetrically. No change in skin of the forehead. Neck is supple and full range movement. Normal motor strength. Normal finger-nose movements. Patient does have vitiligo. 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 Plan is neurology consultation. Anticipate MRI and admission. Rosalind Story.  Donna Betancourt MD, [facility]  Attending Emergency  Physician               Mishel Carolina MD  07/29/22 [id number]

## 2022-07-30 NOTE — H&P
[facility] Neurology   [address] [place] [address]    HISTORY AND PHYSICAL EXAMINATION            Date:   7/29/2022  Patient name:  Yamilex Parham  Date of admission:  7/29/2022  9:49 PM  MRN:   [id number]  Account:  [de-identified]  YOB: 1970  PCP:    No primary care provider on file. Room:   [facility]  Code Status:    No Order    Chief Complaint:     Dysphagia, dysarthria, left sided numbness    History Obtained From:     patient, electronic medical record, verbal sign out from tele-stroke attending Dr. Elizabeth Hart    History of Present Illness: The patient is a 30-year-old male with past medical history of hypertension (on metoprolol 50 mg p.o. daily) who presents to the ED on 7/29/2022 as a transfer from [facility] (where he was initially referred to from an external urgent care facility) after presenting with complaints of acute onset dysphagia, slightly slurred speech, and left-sided numbness. Patient reports waking up around 2 AM due to choking on spit and subsequently having difficulty with swallowing water. He had previously gone to bed at 10:30 PM the night prior without any symptoms and at baseline. He reports medication compliance but admits he missed antihypertensive medication dose on Tuesday due to traveling and also did not take meds on day of presentation due to dysphagia. Patient was not on any antiplatelets, anticoagulation, or statins at home. He has no personal history of stroke but mother, maternal grandmother, and maternal uncle all had strokes. No known personal or family history of bleeding or hypercoagulable disorders. Describes left arm and leg numbness as having limbs \"fall asleep. \"   Also complains of abnormal \"heavy\" sensation of right side of face including heaviness of right eyelid and periorbital region. Admits to mild headache that he says he is not concerned about.   Denies any visual disturbance or drooling and denies any similar symptoms in past.    Prior to arrival, patient had telestroke consult;   CT head and CTA head and neck were done and remarkable for right vocal cord medialization that could indicate paralysis but otherwise negative for any acute intracranial abnormality and negative for any LVO. Patient was loaded with Aspirin & Plavix and recommended transfer to [facility] for stroke work-up. On initial presentation in ED here at [facility], pt  had /106, HR 52 (pt reports baseline pulse in 40s and 50s), vital signs otherwise unremarkable. Initial BS of 140. CBC and BMP unremarkable and negative COVID test.     He is a non-smoker. Past Medical History:     Past Medical History:   Diagnosis Date    Hearing loss     Hypertension     Sleep apnea         Past Surgical History:     Past Surgical History:   Procedure Laterality Date    HERNIA REPAIR  1973    TONSILLECTOMY  2004        Medications Prior to Admission:     Prior to Admission medications    Medication Sig Start Date End Date Taking? Authorizing Provider   metoprolol succinate (TOPROL XL) 50 MG extended release tablet Take 50 mg by mouth in the morning. Historical Provider, MD        Allergies:     Patient has no known allergies. Social History:     Tobacco:    reports that he has never smoked. He has never used smokeless tobacco.  Alcohol:      reports current alcohol use of about 3.0 standard drinks per week. Drug Use:  reports no history of drug use. Family History:     No family history on file. Review of Systems:     Review of Systems   HENT:  Positive for tinnitus (chronic) and trouble swallowing. Negative for hearing loss. Eyes:  Negative for visual disturbance. Respiratory:  Positive for choking. Negative for shortness of breath. Cardiovascular:  Negative for chest pain. Musculoskeletal:  Negative for myalgias. Neurological:  Positive for speech difficulty, numbness and headaches. Negative for seizures and weakness. Psychiatric/Behavioral:  Negative for confusion. Physical Exam:   BP (!) 183/101   Pulse 52   Temp 98.6 °F (37 °C) (Oral)   Resp 16   SpO2 96%   Temp (24hrs), Av.1 °F (36.7 °C), Min:97.6 °F (36.4 °C), Max:98.6 °F (37 °C)    No results for input(s): POCGLU in the last 72 hours. No intake or output data in the 24 hours ending [id number]       General Examination    General Resting comfortably in bed   Head Normocephalic, without obvious abnormality   Eyes Left pupil markedly larger than right, both symmetrical in shape and reactivity to light despite size difference. Subtle right sided ptosis. Neck Good ROM   Lungs Respirations unlabored   Extremities No cyanosis or edema or warmth. Mental status  Speech Alert. Oriented to person, place, and time. Speech is fluent without paraphasic errors but subtle dysarthria present  Good repetition and naming  Can do 1 step, 2 step, and cross-body commands  Language appropriate. No hallucinations or delusions. No SI/HI. Cranial nerves   II - VFF, visual threat intact  III, IV, VI - extra-ocular muscle movement intact, nystagmus  V - sensation symmetric         VII -  Mild flattening of right NLF  VIII - intact hearing to conversational tone          IX, X - symmetrical palate elevation   XI - 5/5 strength symmetric  XII - tongue midline without any notable fasciculation but weakness on tongue protrusion noted   Motor function  Strength: grossly 5/5 b/I in all extremities  Bulk: grossly normal no atrophy  Tone: symmetric b/l arms and legs  Abnormal movements: No abnormal movements or tremor   Sensory function Loss of pinprick sensation of LUE, LLE, and left side of face at V2 and V3 distribution. Decreased sensation to temperature at LUE and LLE. Sensation to touch intact and symmetrical bilaterally in face and all limbs.     Cerebellar No dysmetria. + Romberg   Gait                  Not assessed     INITIAL NIH STROKE SCALE:     Time Performed: 4:45 PM   Result Value Ref Range    Glucose 140 (H) 70 - 99 mg/dL    BUN 9 6 - 20 mg/dL    Creatinine 0.82 0.70 - 1.20 mg/dL    Bun/Cre Ratio 11 9 - 20    Calcium 8.9 8.6 - 10.4 mg/dL    Sodium 136 135 - 144 mmol/L    Potassium 3.8 3.7 - 5.3 mmol/L    Chloride 101 98 - 107 mmol/L    CO2 24 20 - 31 mmol/L    Anion Gap 11 9 - 17 mmol/L    GFR Non-African American >60 >60 mL/min    GFR African American >60 >60 mL/min    GFR Comment         CBC with Auto Differential    Collection Time: 07/29/22  4:45 PM   Result Value Ref Range    WBC 7.7 3.5 - 11.3 k/uL    RBC 4.59 4.21 - 5.77 m/uL    Hemoglobin 14.5 13.0 - 17.0 g/dL    Hematocrit 41.1 40.7 - 50.3 %    MCV 89.5 82.6 - 102.9 fL    MCH 31.6 25.2 - 33.5 pg    MCHC 35.3 (H) 25.2 - 33.5 g/dL    RDW 12.8 11.8 - 14.4 %    Platelets 141 926 - 835 k/uL    MPV 8.8 8.1 - 13.5 fL    NRBC Automated 0.0 0.0 per 100 WBC    Seg Neutrophils 62 36 - 65 %    Lymphocytes 28 24 - 43 %    Monocytes 8 3 - 12 %    Eosinophils % 1 1 - 4 %    Basophils 1 0 - 2 %    Immature Granulocytes 0 0 %    Segs Absolute 4.74 1.50 - 8.10 k/uL    Absolute Lymph # 2.17 1.10 - 3.70 k/uL    Absolute Mono # 0.63 0.10 - 1.20 k/uL    Absolute Eos # 0.09 0.00 - 0.44 k/uL    Basophils Absolute 0.04 0.00 - 0.20 k/uL    Absolute Immature Granulocyte 0.03 0.00 - 0.30 k/uL   Troponin    Collection Time: 07/29/22  4:45 PM   Result Value Ref Range    Troponin, High Sensitivity <6 0 - 22 ng/L   COVID-19, Rapid    Collection Time: 07/29/22  5:18 PM    Specimen: Nasopharyngeal Swab   Result Value Ref Range    Specimen Description . NASOPHARYNGEAL SWAB     SARS-CoV-2, Rapid Not Detected Not Detected   EKG 12 Lead    Collection Time: 07/29/22  5:18 PM   Result Value Ref Range    Ventricular Rate 49 BPM    Atrial Rate 49 BPM    P-R Interval 184 ms    QRS Duration 94 ms    Q-T Interval 456 ms    QTc Calculation (Bazett) 411 ms    P Axis 62 degrees    R Axis 16 degrees    T Axis 30 degrees       Imaging/Diagnostics:  XR CHEST (2 Exam: right facial droop, left side numbness FINDINGS: CT HEAD: BRAIN/VENTRICLES:  No acute intracranial hemorrhage or extraaxial fluid collection. Grey-white differentiation is maintained. No evidence of mass, mass effect or midline shift. No evidence of hydrocephalus. ORBITS: The visualized portion of the orbits demonstrate no acute abnormality. SINUSES:  The visualized paranasal sinuses and mastoid air cells demonstrate no acute abnormality. SOFT TISSUES/SKULL: No acute abnormality of the visualized skull or soft tissues. CTA NECK: AORTIC ARCH/ARCH VESSELS: No dissection or arterial injury. No significant stenosis of the brachiocephalic or subclavian arteries. CAROTID ARTERIES: No dissection, arterial injury, or hemodynamically significant stenosis by NASCET criteria. VERTEBRAL ARTERIES: No dissection, arterial injury, or significant stenosis. SOFT TISSUES: Medialization of the right vocal cord could indicate paralysis. There is a mildly enlarged left submental lymph node measuring 13 x 10 mm. No primary mass is identified in the neck. BONES: No acute osseous abnormality. CTA HEAD: ANTERIOR CIRCULATION: No significant stenosis of the intracranial internal carotid, anterior cerebral, or middle cerebral arteries. No aneurysm. POSTERIOR CIRCULATION: No significant stenosis of the vertebral, basilar, or posterior cerebral arteries. No aneurysm. OTHER: No dural venous sinus thrombosis on this non-dedicated study. 1. No acute intracranial abnormality on noncontrast CT. 2. No large vessel occlusion in the head or neck. 3. Right vocal cord medialization could indicate paralysis. 4. Nonspecific mildly enlarged left submental lymph node. Critical results on the noncontrast CT were called by Dr. Shital Lowe MD to Chelsi Cornejo on 7/29/2022 at 17:18.      CTA HEAD NECK W CONTRAST    Result Date: 7/29/2022  EXAMINATION: CT OF THE HEAD WITHOUT CONTRAST; CTA OF THE HEAD AND NECK WITH CONTRAST 7/29/2022 4:56 pm; 7/29/2022 5:00 pm: TECHNIQUE: CT of the head was performed without the administration of intravenous contrast. Automated exposure control, iterative reconstruction, and/or weight based adjustment of the mA/kV was utilized to reduce the radiation dose to as low as reasonably achievable.; CTA of the head and neck was performed with the administration of intravenous contrast. Multiplanar reformatted images are provided for review. MIP images are provided for review. Stenosis of the internal carotid arteries measured using NASCET criteria. Automated exposure control, iterative reconstruction, and/or weight based adjustment of the mA/kV was utilized to reduce the radiation dose to as low as reasonably achievable. Noncontrast CT of the head with reconstructed 2-D images are also provided for review. COMPARISON: None. HISTORY: ORDERING SYSTEM PROVIDED HISTORY: left side numbness TECHNOLOGIST PROVIDED HISTORY: left side numbness Decision Support Exception - unselect if not a suspected or confirmed emergency medical condition->Emergency Medical Condition (MA) Reason for Exam: left side numbness; ORDERING SYSTEM PROVIDED HISTORY: left side numbness TECHNOLOGIST PROVIDED HISTORY: left side numbness Decision Support Exception - unselect if not a suspected or confirmed emergency medical condition->Emergency Medical Condition (MA) Reason for Exam: right facial droop, left side numbness FINDINGS: CT HEAD: BRAIN/VENTRICLES:  No acute intracranial hemorrhage or extraaxial fluid collection. Grey-white differentiation is maintained. No evidence of mass, mass effect or midline shift. No evidence of hydrocephalus. ORBITS: The visualized portion of the orbits demonstrate no acute abnormality. SINUSES:  The visualized paranasal sinuses and mastoid air cells demonstrate no acute abnormality. SOFT TISSUES/SKULL: No acute abnormality of the visualized skull or soft tissues.  CTA NECK: AORTIC ARCH/ARCH VESSELS: No dissection or arterial injury. No significant stenosis of the brachiocephalic or subclavian arteries. CAROTID ARTERIES: No dissection, arterial injury, or hemodynamically significant stenosis by NASCET criteria. VERTEBRAL ARTERIES: No dissection, arterial injury, or significant stenosis. SOFT TISSUES: Medialization of the right vocal cord could indicate paralysis. There is a mildly enlarged left submental lymph node measuring 13 x 10 mm. No primary mass is identified in the neck. BONES: No acute osseous abnormality. CTA HEAD: ANTERIOR CIRCULATION: No significant stenosis of the intracranial internal carotid, anterior cerebral, or middle cerebral arteries. No aneurysm. POSTERIOR CIRCULATION: No significant stenosis of the vertebral, basilar, or posterior cerebral arteries. No aneurysm. OTHER: No dural venous sinus thrombosis on this non-dedicated study. 1. No acute intracranial abnormality on noncontrast CT. 2. No large vessel occlusion in the head or neck. 3. Right vocal cord medialization could indicate paralysis. 4. Nonspecific mildly enlarged left submental lymph node. Critical results on the noncontrast CT were called by Dr. Jazzy Meyer MD to Gypsy Haley on 7/29/2022 at 17:18. Assessment & Differential Dx:      Primary Problem  Stroke-like symptoms    Active Hospital Problems    Diagnosis Date Noted    Stroke-like symptoms [R29.90] 07/29/2022     Priority: Medium       Case of a 66-year-old male, non-smoker, with past medical history of hypertension and family history of strokes who presents as transfer from [facility] with acute onset dysphagia, left-sided numbness, and dysarthria. LKW 10:30 PM of day prior to presentation. Initial /106, , NIH 3.  Physical exam remarkable for unequal but reactive pupils (left larger than right), loss of pinprick sensation to left upper and left lower extremity and left face at V2 and V3 distribution, decreased sensation to temperature at left upper and left lower extremity, mild right nasolabial fold flattening, some weakness with tongue protrusion, and mild dysarthria. Prior to arrival, patient had had telestroke consult, CT head and CTA head/neck done, and was loaded with Aspirin and Plavix at [facility]. No tPA given. Symptoms create a clinical picture of stroke and we will admit patient for work-up. Suspect stroke  Presentation: right miosis and ptosis, left facial sensory loss (pinprick) at V2/V3 distribution, left sided sensory loss (temperature + pinprick) at LUE & LLE, mild dysarthria, weakness in tongue protrusion. No motor deficits. LKW 10:30 PM on 7/28/22  Risk factors: HTN, family hx of stroke. Denies any smoking or diabetes hx. CTA head and neck: remarkable for right vocal cord medialization which could be indicative of paralysis. No LVO. CT head: No acute intracranial abnormality.   Already loaded with ASA & Plavix at other hospital  ASA 81 mg daily  Plavix 75 mg daily x21 days  Atorvastatin 80 mg nightly  Permissive hypertension, target SBP less than 220  MRI brain without contrast  Echo with bubble study  Swallow evaluation  PT/OT/SLP  Hemoglobin A1c, fasting lipid panel, TSH        Electronically signed by   Sofy Logan DO  7/29/2022  11:45 PM

## 2022-07-31 LAB
ANION GAP SERPL CALCULATED.3IONS-SCNC: 9 MMOL/L (ref 9–17)
BUN BLDV-MCNC: 12 MG/DL (ref 6–20)
CALCIUM SERPL-MCNC: 9.1 MG/DL (ref 8.6–10.4)
CHLORIDE BLD-SCNC: 105 MMOL/L (ref 98–107)
CO2: 27 MMOL/L (ref 20–31)
CREAT SERPL-MCNC: 0.89 MG/DL (ref 0.7–1.2)
ESTIMATED AVERAGE GLUCOSE: 103 MG/DL
GFR AFRICAN AMERICAN: >60 ML/MIN
GFR NON-AFRICAN AMERICAN: >60 ML/MIN
GFR SERPL CREATININE-BSD FRML MDRD: ABNORMAL ML/MIN/{1.73_M2}
GLUCOSE BLD-MCNC: 119 MG/DL (ref 70–99)
HBA1C MFR BLD: 5.2 % (ref 4–6)
POTASSIUM SERPL-SCNC: 4.3 MMOL/L (ref 3.7–5.3)
SODIUM BLD-SCNC: 141 MMOL/L (ref 135–144)

## 2022-07-31 PROCEDURE — 36415 COLL VENOUS BLD VENIPUNCTURE: CPT

## 2022-07-31 PROCEDURE — 97530 THERAPEUTIC ACTIVITIES: CPT

## 2022-07-31 PROCEDURE — 80048 BASIC METABOLIC PNL TOTAL CA: CPT

## 2022-07-31 PROCEDURE — 97161 PT EVAL LOW COMPLEX 20 MIN: CPT

## 2022-07-31 PROCEDURE — 6360000002 HC RX W HCPCS

## 2022-07-31 PROCEDURE — 2060000000 HC ICU INTERMEDIATE R&B

## 2022-07-31 PROCEDURE — 1200000000 HC SEMI PRIVATE

## 2022-07-31 PROCEDURE — 6370000000 HC RX 637 (ALT 250 FOR IP)

## 2022-07-31 RX ADMIN — CLOPIDOGREL 75 MG: 75 TABLET, FILM COATED ORAL at 09:32

## 2022-07-31 RX ADMIN — ATORVASTATIN CALCIUM 80 MG: 80 TABLET, FILM COATED ORAL at 21:38

## 2022-07-31 RX ADMIN — ENOXAPARIN SODIUM 40 MG: 100 INJECTION SUBCUTANEOUS at 09:31

## 2022-07-31 RX ADMIN — Medication 81 MG: at 09:32

## 2022-07-31 ASSESSMENT — ENCOUNTER SYMPTOMS
ABDOMINAL PAIN: 0
SHORTNESS OF BREATH: 0
NAUSEA: 0
DIARRHEA: 0
COLOR CHANGE: 0
VOMITING: 0
PHOTOPHOBIA: 0
TROUBLE SWALLOWING: 0
WHEEZING: 0
VOICE CHANGE: 0
CONSTIPATION: 0
CHEST TIGHTNESS: 0
STRIDOR: 0

## 2022-07-31 ASSESSMENT — PAIN SCALES - GENERAL
PAINLEVEL_OUTOF10: 0
PAINLEVEL_OUTOF10: 0

## 2022-07-31 NOTE — PROGRESS NOTES
Occupational [facility]  Occupational Therapy Not Seen Note    DATE: 2022    NAME: Elaina Alcaraz  MRN: [id number]   : 1970      Patient not seen this date for Occupational Therapy due to:    Patient independent with ADLs and functional tasks with no acute OT needs. Will defer OT evaluation at this time. Please reorder OT if future needs arise.      Next Scheduled Treatment: N/A    Electronically signed by Ignacio Castellanos OT on 2022 at 3:41 PM

## 2022-07-31 NOTE — PROGRESS NOTES
[id number] [facility] Neurology   [address] [place] [address]    Progress Note             Date:   7/31/2022  Patient name:  Julissa Tinajero  Date of admission:  7/29/2022  9:49 PM  MRN:   [id number]  Account:  [de-identified]  YOB: 1970  PCP:    No primary care provider on file. Room:   [address] [place] [address]  Code Status:    Full Code    Chief Complaint:     Acute onset dysphagia and slurred speech    Interval hx:     Patient was seen and examined at bedside today. He is able to eat appropriately, without evidence of dysphagia. Speech therapy saw him and stated he does not require any further therapy. PT/OT to follow. Patient stated he had a slight headache overnight which was managed with Tylenol, however denies any present headaches when seen and examined. He also denies visual disturbances, changes in voice, slurred speech, noticeable facial weakness, paresthesias, focal motor deficits, or loss of balance or lack of coordination. He denies chest pain, shortness of breath, abdominal pain, fevers or chills. Brief History of Present Illness:     Catia Ortez is a 49-year-old male with past medical history of hypertension on metoprolol 50 mg p.o. daily who presented to [facility] ED on 7/29/2022 for acute onset dysphagia, slightly slurred speech, left-sided numbness. He was a transfer from [facility], where he initially arrived from an external urgent care facility. Patient stated he initially woke up around 2 AM on 7/29/2022 with difficulty swallowing and choking on saliva. He subsequently experienced difficulty with swallowing water. Patient went to sleep around 10:30 PM the night prior without any symptoms at baseline. He reports medication compliance with metoprolol, however he missed a dose on Tuesday due to traveling and also did not take medications on Friday (day of presentation) due to dysphagia.     Initially patient described left arm and leg numbness, and describes them as falling asleep. Also complains of abnormal \"heavy\" sensation on the right side of the face including heaviness of right eyelid and periorbital region. CT head without IV contrast and CTA of head and neck were done on admission and were positive for right vocal cord medialization that could indicate paralysis but otherwise negative for any acute intracranial abnormality including LVO, stenosis, or large territorial infarction. Patient was loaded with aspirin and Plavix at [facility] prior to admission to [facility]. Subsequent MRI brain without IV contrast done at [facility] revealed right lateral medullary infarction. Initial presentation, patient had elevated blood pressure 193/106, heart rate 52 (patient reports baseline pulses 40s and 50s), vital signs otherwise unremarkable. Initial blood sugar of 140. CBC and BMP unremarkable and negative COVID test on admission. Patient states he is a non-smoker. Past Medical History:     Past Medical History:   Diagnosis Date    Hearing loss     Hypertension     Sleep apnea         Past Surgical History:     Past Surgical History:   Procedure Laterality Date    [address] [place]    TONSILLECTOMY  2004        Medications Prior to Admission:     Prior to Admission medications    Medication Sig Start Date End Date Taking? Authorizing Provider   metoprolol succinate (TOPROL XL) 50 MG extended release tablet Take 50 mg by mouth in the morning. Historical Provider, MD        Allergies:     Patient has no known allergies. Social History:     Tobacco:    reports that he has never smoked. He has never used smokeless tobacco.  Alcohol:      reports current alcohol use of about 3.0 standard drinks per week. Drug Use:  reports no history of drug use. Family History:     No family history on file. Review of Systems:     Review of Systems   Constitutional:  Negative for chills and fever.    HENT:  Negative for good strength of grade 5/5 in proximal and distal muscle groups   LUE: Significant for good strength of grade 5/5 in proximal and distal muscle groups   RLE: Significant for good strength of grade 5/5 in proximal and distal muscle groups   LLE: Significant for good strength of grade 5/5 in proximal and distal muscle groups      Normal bulk, normal tone and no involuntary movements, no tremor   SENSORY FUNCTION:  Normal touch, normal pinprick, normal vibration, normal proprioception   CEREBELLAR FUNCTION:  Intact fine motor control over upper limbs and lower limbs   REFLEX FUNCTION:  Symmetric in upper and lower extremities, no Babinski sign   STATION and GAIT Deferred       Investigations:      Laboratory Testing:  No results found for this or any previous visit (from the past 24 hour(s)). 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   Patient is now returning back to baseline, and has minimal difficulty with speech or eating. No other focal deficits noted. Lipid panel reveals cholesterol of 166, HDL 62, LDL 87, triglycerides 85. Hemoglobin A1c of 5.2. Plan:       Continue aspirin 81 mg, Plavix 75 mg, and Lipitor 80 mg nightly. Obtain JENNIFER, likely tomorrow. Lipids within normal limits, LDL slightly elevated from goal of < 70. Currently on Statin. Lovenox for DVT prophylaxis. Follow-up further recommendations after discussing the case with attending  The plan was discussed with the patient, patient's family and the medical staff. Consultations:   IP CONSULT TO NEUROLOGY  IP CONSULT TO CARDIOLOGY    Patient is admitted as inpatient status because of co-morbidities listed above, severity of signs and symptoms as outlined, requirement for current medical therapies and most importantly because of direct risk to patient if care not provided in a hospital setting. Cali Hassan DO  7/31/2022  9:08 AM    Copy sent to Dr. Ramirez primary care provider on file.

## 2022-07-31 NOTE — ED NOTES
Patient Health Questionnaire-9 (PHQ-9)    Over the last 2 weeks, how often have you been bothered by any of the following problems? 1. Little Interest or pleasure in doing things? [x] Not at all  [] Several Days  [] More than half the day  []  Nearly every day    2. Feeling down, depressed or hopeless? [x] Not at all  [] Several Days  [] More than half the day  []  Nearly every day    3. Trouble falling or staying asleep, or sleeping too much? [] Not at all  [x] Several Days  [] More than half the day  []  Nearly every day    4. Feeling tired or having little energy? [] Not at all  [x] Several Days  [] More than half the day  []  Nearly every day    5. Poor apettite or overeating? [x] Not at all  [] Several Days  [] More than half the day  []  Nearly every day    6. Feeling bad about yourself-or that you are a failure or have let yourself or your family down? [x] Not at all  [] Several Days  [] More than half the day  []  Nearly every day    7. Trouble concentrating on things, such as reading the newspaper or watching television? [x] Not at all  [] Several Days  [] More than half the day  []  Nearly every day    8. Moving or speaking so slowly that other people could have noticed? Or the opposite-being so fidgety or restless that you have been moving around a lot more than usual?   [x] Not at all  [] Several Days  [] More than half the day  []  Nearly every day    9. Thoughts that you would be better off dead or of hurting yourself in some way? [x] Not at all  [] Several Days  [] More than half the day  []  Nearly every day    Total Score: 2    If you checked off any problems, how difficult have these problems made it for you to do your work, take care of things at home, or get along with other people? [x] Not difficult at all  [] Somewhat Difficult  [] Very Difficult  []  Extremely Difficult     Patient denied any concerns at this time.      K[address], [facility]  07/31/22 8831

## 2022-07-31 NOTE — PROGRESS NOTES
Patients blood pressure has been elevated. Neurology resident bedside. States okay for elevated BP. Will normalize the BP in 24 hours, due to findings on MRI.

## 2022-07-31 NOTE — PROGRESS NOTES
Physical Therapy  Facility/Department: [facility] NEURO  Physical Therapy Initial Assessment    Name: Lynnette Hurtado  : 1970  MRN: [id number]  Date of Service: 2022    History copied and pasted from H+P:  The patient developed sudden onset difficulty in swallowing along with difficulty in speaking and left-sided numbness with difficulty in ambulation and coordination. The patient noticed the symptoms upon waking up from sleep, yesterday. The patient presented to the emergency department at a local hospital in [place]. He was eventually transferred to [facility] ER for further evaluation and, was admitted for stroke work-up. The patient was noted to have mild flattening of the right nasolabial fold, subtle difficulty in speaking-dysarthria, modest weakness in the protrusion of the tongue, and loss of light touch, pinprick sensation in the left upper and lower extremities. The patient has a baseline history of hypertension and a strong maternal side-family history of stroke. Discharge Recommendations:  No further therapy required at discharge. PT Equipment Recommendations  Equipment Needed: No      Patient Diagnosis(es): The encounter diagnosis was Numbness. Past Medical History:  has a past medical history of Hearing loss, Hypertension, and Sleep apnea. Past Surgical History:  has a past surgical history that includes Tonsillectomy () and hernia repair (). Assessment   Pt cooperative, motivated, c/o mild tingling L 3rd-5th fingers and L foot ankle distal, but states it's improving. He's currently functioning independently, able to ambulate without device w/o LOB, independent on stairs.   Continued PT not necessary  Therapy Prognosis: Good  Decision Making: Low Complexity  No Skilled PT: Independent with functional mobility   Requires PT Follow-Up: No  Activity Tolerance  Activity Tolerance: Patient tolerated treatment well     Plan   Plan  Plan: Discharge with evaluation only  Safety Devices  Type of Devices: Call light within reach, Left in chair  Restraints  Restraints Initially in Place: No     Restrictions  Restrictions/Precautions  Restrictions/Precautions: Up as Tolerated  Required Braces or Orthoses?: No     Subjective   General  Patient assessed for rehabilitation services?: Yes  Response To Previous Treatment: Not applicable  Family / Caregiver Present: No  Follows Commands: Within Functional Limits  Subjective  Subjective: denies pain         Social/Functional History  Social/Functional History  Lives With: Spouse, Daughter, Son  Type of Home: House  Home Layout: One level  Home Access: Stairs to enter without rails (can go in house with 1 HR)  Entrance Stairs - Number of Steps: 2  Bathroom Shower/Tub: Tub/Shower unit  Bathroom Toilet: Standard  Bathroom Accessibility: Accessible  Has the patient had two or more falls in the past year or any fall with injury in the past year?: No  Receives Help From: Family, Friend(s)  ADL Assistance: Independent  Homemaking Assistance: Independent  Homemaking Responsibilities: No  Ambulation Assistance: Independent  Transfer Assistance: Independent  Active : Yes  Mode of Transportation: Car  Occupation: Full time employment  Type of Occupation:   Vision/Hearing  Vision  Vision: Within Functional Limits  Hearing  Hearing: Within functional limits    Cognition   Orientation  Overall Orientation Status: Within Normal Limits  Cognition  Overall Cognitive Status: WNL     Objective   Heart Rate: 68  Heart Rate Source: Monitor  BP: (!) 155/100  188/119 at beginning of session, retaken immediately, 171/102; discussed with RN, she stated permissive hypertension per neurology.     BP Location: Left upper arm  BP Method: Automatic  Patient Position: Semi fowlers  MAP (Calculated): 118.33  Resp: 18  SpO2: 95 %  O2 Device: None (Room air)     Observation/Palpation  Posture: Good        AROM RLE (degrees)  RLE AROM: WFL  AROM LLE (degrees)  LLE AROM : WFL  AROM RUE (degrees)  RUE AROM : WFL  AROM LUE (degrees)  LUE AROM : WFL  Strength RLE  Strength RLE: WNL  Strength LLE  Strength LLE: WNL  Strength RUE  Strength RUE: WNL  Strength LUE  Strength LUE: WNL           Bed mobility  Rolling to Right: Independent  Supine to Sit: Independent  Scooting: Independent  Transfers  Sit to Stand: Independent  Stand to sit:  Independent  Bed to Chair: Independent  Stand Pivot Transfers: Independent  Ambulation  Surface: level tile  Device: No Device  Assistance: Independent  Gait Deviations: None  Distance: 230'x1  Stairs/Curb  Stairs?: Yes  Stairs  # Steps : 10  Rails: Right ascending  Device: No Device  Assistance: Independent     Balance  Posture: Good  Sitting - Static: Good  Sitting - Dynamic: Good  Standing - Static: Good  Standing - Dynamic: Good    AM-PAC Score  AM-PAC Inpatient Mobility Raw Score : 24 (07/31/22 1035)  AM-PAC Inpatient T-Scale Score : 61.14 (07/31/22 1035)  Mobility Inpatient CMS 0-100% Score: 0 (07/31/22 1035)  Mobility Inpatient CMS G-Code Modifier : CH (07/31/22 1035)      Goals  Short Term Goals  Time Frame for Short term goals: 1 visit  Short term goal 1: evaluate and give recommendations: pt is independent; continued PT not necessary  Patient Goals   Patient goals : go home       Education  Patient Education  Education Given To: Patient  Education Provided: Role of Therapy;Plan of Care  Education Method: Verbal  Barriers to Learning: None  Education Outcome: Verbalized understanding      Therapy Time   Individual Concurrent Group Co-treatment   Time In 0945         Time Out 1030         Minutes 45         Timed Code Treatment Minutes: [facility]       Sebastián Galloway PT

## 2022-08-01 ENCOUNTER — APPOINTMENT (OUTPATIENT)
Dept: CARDIAC CATH/INVASIVE PROCEDURES | Age: 52
DRG: 042 | End: 2022-08-01
Payer: COMMERCIAL

## 2022-08-01 VITALS
RESPIRATION RATE: 14 BRPM | TEMPERATURE: 97.2 F | BODY MASS INDEX: 31.61 KG/M2 | HEIGHT: 69 IN | HEART RATE: 66 BPM | DIASTOLIC BLOOD PRESSURE: 100 MMHG | WEIGHT: 213.41 LBS | OXYGEN SATURATION: 96 % | SYSTOLIC BLOOD PRESSURE: 171 MMHG

## 2022-08-01 LAB
ANION GAP SERPL CALCULATED.3IONS-SCNC: 13 MMOL/L (ref 9–17)
BUN BLDV-MCNC: 12 MG/DL (ref 6–20)
CALCIUM SERPL-MCNC: 9.1 MG/DL (ref 8.6–10.4)
CHLORIDE BLD-SCNC: 103 MMOL/L (ref 98–107)
CO2: 22 MMOL/L (ref 20–31)
CREAT SERPL-MCNC: 0.86 MG/DL (ref 0.7–1.2)
GFR AFRICAN AMERICAN: >60 ML/MIN
GFR NON-AFRICAN AMERICAN: >60 ML/MIN
GFR SERPL CREATININE-BSD FRML MDRD: ABNORMAL ML/MIN/{1.73_M2}
GLUCOSE BLD-MCNC: 107 MG/DL (ref 70–99)
HCT VFR BLD CALC: 44.4 % (ref 40.7–50.3)
HEMOGLOBIN: 14.9 G/DL (ref 13–17)
LV EF: 55 %
LVEF MODALITY: NORMAL
MCH RBC QN AUTO: 31.1 PG (ref 25.2–33.5)
MCHC RBC AUTO-ENTMCNC: 33.6 G/DL (ref 28.4–34.8)
MCV RBC AUTO: 92.7 FL (ref 82.6–102.9)
NRBC AUTOMATED: 0 PER 100 WBC
PDW BLD-RTO: 12.8 % (ref 11.8–14.4)
PLATELET # BLD: 240 K/UL (ref 138–453)
PMV BLD AUTO: 8.9 FL (ref 8.1–13.5)
POTASSIUM SERPL-SCNC: 3.7 MMOL/L (ref 3.7–5.3)
RBC # BLD: 4.79 M/UL (ref 4.21–5.77)
SODIUM BLD-SCNC: 138 MMOL/L (ref 135–144)
WBC # BLD: 8.7 K/UL (ref 3.5–11.3)

## 2022-08-01 PROCEDURE — 93306 TTE W/DOPPLER COMPLETE: CPT

## 2022-08-01 PROCEDURE — 99233 SBSQ HOSP IP/OBS HIGH 50: CPT | Performed by: PSYCHIATRY & NEUROLOGY

## 2022-08-01 PROCEDURE — 36415 COLL VENOUS BLD VENIPUNCTURE: CPT

## 2022-08-01 PROCEDURE — 2709999900 HC NON-CHARGEABLE SUPPLY

## 2022-08-01 PROCEDURE — 6360000002 HC RX W HCPCS

## 2022-08-01 PROCEDURE — 2500000003 HC RX 250 WO HCPCS

## 2022-08-01 PROCEDURE — 85027 COMPLETE CBC AUTOMATED: CPT

## 2022-08-01 PROCEDURE — 0JH602Z INSERTION OF MONITORING DEVICE INTO CHEST SUBCUTANEOUS TISSUE AND FASCIA, OPEN APPROACH: ICD-10-PCS | Performed by: INTERNAL MEDICINE

## 2022-08-01 PROCEDURE — C1764 EVENT RECORDER, CARDIAC: HCPCS

## 2022-08-01 PROCEDURE — 6370000000 HC RX 637 (ALT 250 FOR IP)

## 2022-08-01 PROCEDURE — 80048 BASIC METABOLIC PNL TOTAL CA: CPT

## 2022-08-01 PROCEDURE — 33285 INSJ SUBQ CAR RHYTHM MNTR: CPT

## 2022-08-01 RX ORDER — HYDROCHLOROTHIAZIDE 25 MG/1
25 TABLET ORAL DAILY
Status: DISCONTINUED | OUTPATIENT
Start: 2022-08-01 | End: 2022-08-01 | Stop reason: HOSPADM

## 2022-08-01 RX ORDER — METOPROLOL SUCCINATE 50 MG/1
50 TABLET, EXTENDED RELEASE ORAL DAILY
Status: DISCONTINUED | OUTPATIENT
Start: 2022-08-01 | End: 2022-08-01 | Stop reason: HOSPADM

## 2022-08-01 RX ORDER — ASPIRIN 81 MG/1
81 TABLET ORAL DAILY
Qty: 30 TABLET | Refills: 3 | Status: SHIPPED | OUTPATIENT
Start: 2022-08-02

## 2022-08-01 RX ORDER — HYDROCHLOROTHIAZIDE 25 MG/1
25 TABLET ORAL DAILY
Qty: 30 TABLET | Refills: 3 | Status: SHIPPED | OUTPATIENT
Start: 2022-08-02

## 2022-08-01 RX ORDER — CLOPIDOGREL BISULFATE 75 MG/1
75 TABLET ORAL DAILY
Qty: 30 TABLET | Refills: 3 | Status: SHIPPED | OUTPATIENT
Start: 2022-08-02 | End: 2022-08-21

## 2022-08-01 RX ORDER — ATORVASTATIN CALCIUM 80 MG/1
80 TABLET, FILM COATED ORAL NIGHTLY
Qty: 30 TABLET | Refills: 3 | Status: SHIPPED | OUTPATIENT
Start: 2022-08-01

## 2022-08-01 RX ADMIN — Medication 81 MG: at 15:53

## 2022-08-01 RX ADMIN — HYDROCHLOROTHIAZIDE 25 MG: 25 TABLET ORAL at 17:08

## 2022-08-01 RX ADMIN — METOPROLOL SUCCINATE 50 MG: 50 TABLET, FILM COATED, EXTENDED RELEASE ORAL at 17:08

## 2022-08-01 ASSESSMENT — ENCOUNTER SYMPTOMS
NAUSEA: 0
BACK PAIN: 0
APNEA: 0
ABDOMINAL PAIN: 0
DIARRHEA: 0
VOMITING: 0
RHINORRHEA: 0
CONSTIPATION: 0
SORE THROAT: 0
SHORTNESS OF BREATH: 0
STRIDOR: 0
WHEEZING: 0

## 2022-08-01 NOTE — PLAN OF CARE
Problem: Discharge Planning  Goal: Discharge to home or other facility with appropriate resources  8/1/2022 1803 by Emma Paredes RN  Outcome: Completed     Problem: Pain  Goal: Verbalizes/displays adequate comfort level or baseline comfort level  8/1/2022 1803 by Emma Paredes RN  Outcome: Completed     Problem: Chronic Conditions and Co-morbidities  Goal: Patient's chronic conditions and co-morbidity symptoms are monitored and maintained or improved  8/1/2022 1803 by Emma Paredes RN  Outcome: Completed     Problem: ABCDS Injury Assessment  Goal: Absence of physical injury  8/1/2022 1803 by Emma Paredes RN  Outcome: Completed   Electronically signed by Mary Starr RN on 8/1/2022 at 6:04 PM

## 2022-08-01 NOTE — PROGRESS NOTES
Pt returned from JENNIFER/loop recorder. Med tronic at bedside educating pt.   Electronically signed by Maribeth Escalera RN on 8/1/2022 at 2:39 PM

## 2022-08-01 NOTE — PROGRESS NOTES
Patient called out, stating that he would like to leave. /105. Page sent to primary team    Electronically signed by Maribeth Escalera RN on 8/1/2022 at 3:33 PM    Dr. Dana Pond restarted patient's home medications for blood pressure.    Electronically signed by Maribeth Escalera RN on 8/1/2022 at 3:59 PM

## 2022-08-01 NOTE — PROGRESS NOTES
Bedside swallow evaluation performed, gag reflex present. Patient has not difficulty with ice chips and drinking water.    Electronically signed by Sergio Betancourt RN on 8/1/2022 at 3:27 PM

## 2022-08-01 NOTE — DISCHARGE INSTRUCTIONS
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 You were also found to have hypertensive emergency. You have been started on aspirin Plavix and atorvastatin. We also resumed your home metoprolol and added HCTZ for management of your blood pressure. A loop recorder was placed to monitor your heart for any problems with your heart rhythm  Please take your medications as prescribed  Please call and make an appointment with your cardiologist  Please call and follow-up with the neurologist in 1 week  Please monitor your blood pressure at home daily and call your PCP if BP > 160  Please return if you have any worsening of your symptoms. Please keep watch for signs of bleeding and easy bruising history of being placed on antiplatelet medications which puts you at high risk for bleeding. Taking Aspirin and Other Antiplatelets Safely: Care Instructions  Your Care Instructions     Aspirin and other antiplatelet medicines help prevent blood clots from forming. They can help some people lower their risk of a heart attack or stroke. But these medicines can also make you more likely to bleed. That's why it's important to talk to your doctor before you start taking aspirin every day. It's not right for everyone. And if you and your doctor decide these medicinesare right for you, learn how to take them safely. If you take aspirin, be sure you know how to take it. Your doctor can tell you what dose to take and how often to take it. One low-dose aspirin is 81 milligrams (mg). But the dose for daily aspirin can range from 81 mg to 325 mg. If you take another antiplatelet, take it as prescribed. Follow-up care is a key part of your treatment and safety. Be sure to make and go to all appointments, and call your doctor if you are having problems. It's also a good idea to know your test results and keep alist of the medicines you take. How can you care for yourself at home?   Before you start to take daily aspirin or some other antiplatelet, tell your doctor all the medicines, vitamins, herbal products, and supplements you take. Tell your doctors, dentist, and pharmacist that you take an antiplatelet. Take your medicine as your doctor directs. Make sure that you understand exactly what your doctor wants you to do. If another doctor says to stop taking the medicine for any reason, talk to the doctor who prescribed it before you stop. Take your medicine at the same time every day. Do not chew or crush the coated or time-release forms of your medicine. If you miss a dose, don't take an extra dose to make up for it. Ask your doctor whether you can drink alcohol. And ask how much you can drink. When you take an antiplatelet, drinking too much raises your risk for liver damage and stomach bleeding. If you are pregnant, are breastfeeding, or plan to become pregnant, talk to your doctor about what medicines are safe. Talk with your doctor before you take a pain medicine. Many pain medicines have aspirin. Too much aspirin can be harmful. Wear medical alert jewelry. This lets others know that you take an antiplatelet. You can buy it at most [employer]. Try to avoid injuries that might make you bleed. For example, be careful when you exercise and when you play sports. Make your home safe to reduce your risk of falling. When should you call for help? Call 911  anytime you think you may need emergency care. For example, call if:    You have a sudden, severe headache that is different from past headaches. Call your doctor now or seek immediate medical care if:    You have any abnormal bleeding, such as:  A nosebleed that you can't easily stop. Bloody or black stools, or rectal bleeding. Bloody or pink urine. You feel dizzy or lightheaded or feel like you may faint. Watch closely for changes in your health, and be sure to contact your doctor ifyou have any problems. Where can you learn more?   Go to https://chpepiceweb.[employer]. org and sign in to your [employer] account. Enter J088 in the [facility] box to learn more about \"Taking Aspirin and Other Antiplatelets Safely: Care Instructions. \"     If you do not have an account, please click on the \"Sign Up Now\" link. Current as of: January 10, 2022               Content Version: 13.3  © 2006-2022 [employer]. Care instructions adapted under license by [facility] ([facility]). If you have questions about a medical condition or this instruction, always ask your healthcare professional. [place] [address] any warranty or liability for your use of this information. Stroke: Care Instructions  Overview     A stroke is damage to the brain that occurs when a blood vessel in the brain bursts or is blocked by a blood clot. Without blood and the oxygen it carries, part of the brain is damaged. The part of your body controlled by that part ofyour brain may not function properly now. The brain is an amazing organ that can heal itself to some degree. The stroke you had damaged part of your brain. But other parts of your brain may take overin some way for the damaged areas. Your doctor will talk with you about what you can do to prevent another stroke. You can help by managing other health problems that raise your risk, such as atrial fibrillation or high blood pressure. Have a heart-healthy lifestyle which includes being active, eating healthy foods, staying at a healthy weight,and not smoking. You may also take medicine that prevents blood clots. Enter a stroke rehabilitation (rehab) program if your doctor recommends it. Stroke rehab is training and therapy to help you recover, prevent problems, and relearn how to do everyday things you have not been able to do since your stroke. The focus will depend on how the stroke has affected your ability to dothe things you want and need to do.   Follow-up care is a key part of your treatment and safety. Be sure to make and go to all appointments, and call your doctor if you are having problems. It's also a good idea to know your test results and keep alist of the medicines you take. How can you care for yourself at home? Attend stroke rehabilitation (rehab) if your doctor recommends it. Your rehab plan will be based on your goals and how the stroke affected you. You will get instructions on how to manage specific problems that you might have because of the stroke. Manage other health problems that raise your risk of another stroke. These include atrial fibrillation, diabetes, high blood pressure, and high cholesterol. Have a heart-healthy lifestyle. Don't smoke and avoid secondhand smoke. Limit alcohol to 2 drinks a day for men and 1 drink a day for women. Stay at a healthy weight. Lose weight if you need to. Be active. Ask your doctor what type and level of activity is safe for you. Eat heart-healthy foods. These include vegetables, fruits, nuts, beans, lean meat, fish, and whole grains. Limit sodium and sugar. If you think you may have a problem with alcohol or drug use, talk to your doctor. Medicines    Be safe with medicines. Take your medicines exactly as prescribed. Call your doctor if you think you are having a problem with your medicine. You will get more details on the specific medicines your doctor prescribes. You may take a few medicines to help lower your risk of another stroke. These include:  Blood pressure medicine such as an ACE (angiotensin-converting enzyme) inhibitor, angiotensin II receptor blocker (ARBs), or diuretic. Cholesterol medicine such as a statin. Aspirin or another blood thinner to prevent blood clots. If your doctor prescribed a blood thinner, be sure you get instructions about how to take your medicine safely. Blood thinners can cause serious bleeding problems.      Do not take any over-the-counter medicines or herbal products without talking to your doctor first.     If you take hormonal birth control or hormone therapy, talk to your doctor about whether they are right for you. They may raise the risk of stroke in some people. For caregivers    Make the home safe. You may get advice from the stroke rehab team about what changes might be needed. Here are some examples. Set up a bedroom that does not require climbing stairs. Be sure the bathroom is on the same floor. Move throw rugs and furniture that could cause falls. Make sure that the lighting is good. Put grab bars and seats in tubs and showers. Provide transportation until they can drive again. Find out what they can do and what they need help with. Try not to do things that they can do on their own. Help them learn and practice new skills. Visit and talk with them often. Try doing activities together that you both enjoy, such as playing cards or board games. Encourage other people to visit too. Take care of yourself. Here are some tips that might help. Do not try to do everything yourself. Ask the stroke rehab team for help. Ask friends and family members to help. Eat well, get enough rest, and take time to do things that you enjoy. Keep up with your own doctor visits, and make sure to take your medicines regularly. Join a local support group. Find out if you qualify for home health care visits to help with rehab or for adult day care. When should you call for help? Call 911 anytime you think you may need emergency care. For example, call if:    You have signs of another stroke. These may include:  Sudden numbness, tingling, weakness, or loss of movement in your face, arm, or leg, especially on only one side of your body. Sudden vision changes. Sudden trouble speaking. Sudden confusion or trouble understanding simple statements. Sudden problems with walking or balance. A sudden, severe headache that is different from past headaches. Fainting.   A seizure. Call 911 even if these symptoms go away in a few minutes. Call your doctor now or seek immediate medical care if:    You have new symptoms that may be related to your stroke, such as falls or trouble swallowing. Watch and call if:    You have been feeling sad, depressed, or hopeless, or you have lost interest in things that you usually enjoy. You have anxiety or fear that affects your life. Watch closely for changes in your health, and be sure to contact your doctor ifyou have any problems. Where can you learn more? Go to https://BioSTL.[employer]. org and sign in to your [employer] account. Enter F450 in the [employer] box to learn more about \"Stroke: Care Instructions. \"     If you do not have an account, please click on the \"Sign Up Now\" link. Current as of: March 28, 2022               Content Version: 13.3  © 0275-0693 [employer]. Care instructions adapted under license by [facility] ([facility]). If you have questions about a medical condition or this instruction, always ask your healthcare professional. [place] any warranty or liability for your use of this information.

## 2022-08-01 NOTE — PROGRESS NOTES
[address] [facility] Neurology   [facility]    Progress Note             Date:   8/1/2022  Patient name:  Paul Frias  Date of admission:  7/29/2022  9:49 PM  MRN:   [id number]  Account:  [de-identified]  YOB: 1970  PCP:    No primary care provider on file. Room:   [address] [place] [address]  Code Status:    Full Code    Chief Complaint:     Acute onset dysphagia and slurred speech    Interval hx:       Patient alert and oriented, on room air, /95, pulse 55. Labs reviewed are normal.     Complains of waiting without knowing what the plan is. He wants to leave by 10am if his JENNIFER is not done by then. He has been NPO since 8pm last night but thinks his dysphagia is better. He mentions he can get it outpatient. Patient counseled on risks. No abnormal bleeding, He also denies visual disturbances, changes in voice, slurred speech, noticeable facial weakness, paresthesias, focal motor deficits, or loss of balance or lack of coordination. He denies chest pain, shortness of breath, abdominal pain, fevers or chills. Brief History of Present Illness:     Maritza Souza is a 22-year-old male with past medical history of hypertension on metoprolol 50 mg p.o. daily who presented to [facility] ED on 7/29/2022 for acute onset dysphagia, slightly slurred speech, left-sided numbness. He was a transfer from [facility], where he initially arrived from an external urgent care facility. Patient stated he initially woke up around 2 AM on 7/29/2022 with difficulty swallowing and choking on saliva. He subsequently experienced difficulty with swallowing water. Patient went to sleep around 10:30 PM the night prior without any symptoms at baseline. He reports medication compliance with metoprolol, however he missed a dose on Tuesday due to traveling and also did not take medications on Friday (day of presentation) due to dysphagia.     Initially patient described left arm and leg numbness, and describes them as falling asleep. Also complains of abnormal \"heavy\" sensation on the right side of the face including heaviness of right eyelid and periorbital region. CT head without IV contrast and CTA of head and neck were done on admission and were positive for right vocal cord medialization that could indicate paralysis but otherwise negative for any acute intracranial abnormality including LVO, stenosis, or large territorial infarction. Patient was loaded with aspirin and Plavix at [facility] prior to admission to [facility]. Subsequent MRI brain without IV contrast done at [facility] revealed right lateral medullary infarction. Initial presentation, patient had elevated blood pressure 193/106, heart rate 52 (patient reports baseline pulses 40s and 50s), vital signs otherwise unremarkable. Initial blood sugar of 140. CBC and BMP unremarkable and negative COVID test on admission. Patient states he is a non-smoker. Past Medical History:     Past Medical History:   Diagnosis Date    Hearing loss     Hypertension     Sleep apnea         Past Surgical History:     Past Surgical History:   Procedure Laterality Date    [address]    TONSILLECTOMY  2004        Medications Prior to Admission:     Prior to Admission medications    Medication Sig Start Date End Date Taking? Authorizing Provider   metoprolol succinate (TOPROL XL) 50 MG extended release tablet Take 50 mg by mouth in the morning. Historical Provider, MD        Allergies:     Patient has no known allergies. Social History:     Tobacco:    reports that he has never smoked. He has never used smokeless tobacco.  Alcohol:      reports current alcohol use of about 3.0 standard drinks per week. Drug Use:  reports no history of drug use. Family History:     No family history on file. Review of Systems:     Review of Systems   Constitutional:  Negative for appetite change and fever. HENT:  Negative for rhinorrhea and sore throat. Respiratory:  Negative for apnea, shortness of breath, wheezing and stridor. Cardiovascular:  Negative for chest pain. Gastrointestinal:  Negative for abdominal pain, constipation, diarrhea, nausea and vomiting. Genitourinary:  Negative for hematuria. Musculoskeletal:  Negative for back pain, gait problem and myalgias. Skin:  Negative for rash. Neurological:  Negative for dizziness, seizures, syncope, facial asymmetry, speech difficulty and headaches. Improved dysphagia       Physical Exam:   BP (!) 157/95   Pulse 55   Temp 98.2 °F (36.8 °C)   Resp 14   Ht 5' 9\" (1.753 m)   Wt 213 lb 6.5 oz (96.8 kg)   SpO2 96%   BMI 31.51 kg/m²   Temp (24hrs), Av.8 °F (36.6 °C), Min:97.2 °F (36.2 °C), Max:98.2 °F (36.8 °C)    No results for input(s): POCGLU in the last 72 hours.   No intake or output data in the 24 hours ending 22 0818      GENERAL  Appears comfortable and in no distress   HEENT  NC/ AT   HEART  S1 and S2 heard; palpation of pulses: radial pulse    NECK  Supple and no bruits heard   MENTAL STATUS:  Alert, oriented, intact memory, no confusion, normal speech, normal language, no hallucination or delusion   CRANIAL NERVES: II     -      Visual fields intact to confrontation  III,IV,VI -  PERR, EOMs full, no ptosis  V     -     Normal facial sensation   VII    -     Normal facial symmetry  VIII   -     Intact hearing   IX,X -     Symmetrical palate  XI    -     Symmetrical shoulder shrug  XII   -     Midline tongue, no atrophy    MOTOR FUNCTION: RUE: Significant for good strength of grade 5/5 in proximal and distal muscle groups   LUE: Significant for good strength of grade 5/5 in proximal and distal muscle groups   RLE: Significant for good strength of grade 5/5 in proximal and distal muscle groups   LLE: Significant for good strength of grade 5/5 in proximal and distal muscle groups      Normal bulk, normal tone and no involuntary movements, no tremor   SENSORY FUNCTION:  Normal touch, normal pinprick, normal vibration, normal proprioception   CEREBELLAR FUNCTION:  Intact fine motor control over upper limbs and lower limbs   REFLEX FUNCTION:  Symmetric in upper and lower extremities, no Babinski sign   STATION and GAIT Deferred       Investigations:      Laboratory Testing:  Recent Results (from the past 24 hour(s))   Basic Metabolic Panel    Collection Time: 07/31/22  9:20 AM   Result Value Ref Range    Glucose 119 (H) 70 - 99 mg/dL    BUN 12 6 - 20 mg/dL    Creatinine 0.89 0.70 - 1.20 mg/dL    Calcium 9.1 8.6 - 10.4 mg/dL    Sodium 141 135 - 144 mmol/L    Potassium 4.3 3.7 - 5.3 mmol/L    Chloride 105 98 - 107 mmol/L    CO2 27 20 - 31 mmol/L    Anion Gap 9 9 - 17 mmol/L    GFR Non-African American >60 >60 mL/min    GFR African American >60 >60 mL/min    GFR Comment         CBC    Collection Time: 08/01/22  1:02 AM   Result Value Ref Range    WBC 8.7 3.5 - 11.3 k/uL    RBC 4.79 4.21 - 5.77 m/uL    Hemoglobin 14.9 13.0 - 17.0 g/dL    Hematocrit 44.4 40.7 - 50.3 %    MCV 92.7 82.6 - 102.9 fL    MCH 31.1 25.2 - 33.5 pg    MCHC 33.6 28.4 - 34.8 g/dL    RDW 12.8 11.8 - 14.4 %    Platelets 098 212 - 834 k/uL    MPV 8.9 8.1 - 13.5 fL    NRBC Automated 0.0 0.0 per 100 WBC   Basic Metabolic Panel w/ Reflex to MG    Collection Time: 08/01/22  1:02 AM   Result Value Ref Range    Glucose 107 (H) 70 - 99 mg/dL    BUN 12 6 - 20 mg/dL    Creatinine 0.86 0.70 - 1.20 mg/dL    Calcium 9.1 8.6 - 10.4 mg/dL    Sodium 138 135 - 144 mmol/L    Potassium 3.7 3.7 - 5.3 mmol/L    Chloride 103 98 - 107 mmol/L    CO2 22 20 - 31 mmol/L    Anion Gap 13 9 - 17 mmol/L    GFR Non-African American >60 >60 mL/min    GFR African American >60 >60 mL/min    GFR Comment           Recent Labs     08/01/22 0102   WBC 8.7   RBC 4.79   HGB 14.9   HCT 44.4   MCV 92.7   MCH 31.1   MCHC 33.6   RDW 12.8      MPV 8.9       Recent Labs     08/01/22 0102      K 3.7      CO2 22   BUN 12   CREATININE 0.86   GLUCOSE 107*   CALCIUM 9.1       Hemoglobin A1C   Date Value Ref Range Status   07/30/2022 5.2 4.0 - 6.0 % Final       Assessment :      Primary Problem  Stroke-like symptoms    Active Hospital Problems    Diagnosis Date Noted    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 Patient almost back to baseline with improved dysphagia. NPO since yesterday for JENNIFER. No other focal deficits noted. Lipid panel reveals cholesterol of 166, HDL 62, LDL 87, triglycerides 85. Hemoglobin A1c of 5.2. Patient considering leaving AMA. Plan:       Continue aspirin 81 mg, Plavix 75 mg, and Lipitor 80 mg nightly. Unable to pass JENNIFER probe due to dysphagia. Loop recorder placed. ECHO ordered  Lipids within normal limits, LDL slightly elevated from goal of < 70. Currently on Statin. Lovenox for DVT prophylaxis. Resume home Lopressor 50mg daily. Will add HCTZ 25mg daily. Follow-up further recommendations after discussing the case with attending  The plan was discussed with the patient, patient's family and the medical staff.    Consultations:   IP CONSULT TO NEUROLOGY  IP CONSULT TO CARDIOLOGY    Patient is admitted as inpatient status because of co-morbidities listed above, severity of signs and symptoms as outlined, requirement for current medical therapies and most importantly because of direct risk to patient if care not provided in a hospital setting. Sridevi Romero MD  8/1/2022  8:18 AM    Copy sent to Dr. Ellis [place] primary care provider on file.

## 2022-08-01 NOTE — CONSULTS
[facility]    Consult                        Today's Date: 8/1/2022  Patient Name: Kori Harley  Date of admission: 7/29/2022  9:49 PM  Patient's age: 46 y.o., 1970  Admission Dx: Numbness [R20.0]  Stroke-like symptoms [R29.90]    Reason for Consult:  Cardiac evaluation    Requesting Physician: Bev Bal MD    CHIEF COMPLAINT:  stroke like symptoms     History Obtained From:  patient, electronic medical record    HISTORY OF PRESENT ILLNESS:      The patient is a 46 y.o.  male who history of HTN is admitted to the hospital for strokelike symptoms. Patient stated that he was working on his yard all day long  went to sleep and wake up at 2 AM on Friday with difficulty walking and unable to swallow his own sputum then went back to sleep wake up still tired and not feeling well ,called off work and decided to go to the urgent care in [place] and was eventually transferred from [facility] to ER for further stroke work-up. MRI of the brain was obtained and revealed acute lacunar infarct involving the right lateral medulla's without hemorrhage, cardiology was consulted for JENNIFER / loop recorder to rule out embolic event  Patient seen and examined denies chest pain or shortness of breath, resting with no distress   Past Medical History:   has a past medical history of Hearing loss, Hypertension, and Sleep apnea. Past Surgical History:   has a past surgical history that includes Tonsillectomy (2004) and hernia repair (1973). Home Medications:    Prior to Admission medications    Medication Sig Start Date End Date Taking? Authorizing Provider   metoprolol succinate (TOPROL XL) 50 MG extended release tablet Take 50 mg by mouth in the morning. Historical Provider, MD       Allergies:  Patient has no known allergies. Social History:   reports that he has never smoked. He has never used smokeless tobacco. He reports current alcohol use of about 3.0 standard drinks per week.  He reports that he does not use drugs. Family History: family history is not on file. No h/o sudden cardiac death. Yes for premature CAD ( uncle  at age 36 from hear attack )    REVIEW OF SYSTEMS:    Constitutional: there has been no unanticipated weight loss. There's been No change in energy level, No change in activity level. Eyes: No visual changes or diplopia. No scleral icterus. ENT: No Headaches, hearing loss or vertigo. No mouth sores or sore throat. Cardiovascular: see above  Respiratory: see above  Gastrointestinal: No abdominal pain, appetite loss, blood in stools. Genitourinary: No dysuria, trouble voiding, or hematuria. Musculoskeletal:  No gait disturbance, No weakness or joint complaints. Integumentary: No rash or pruritis. Neurological: No headache or diplopia. No tingling  Psychiatric: No anxiety, or depression. Endocrine: No temperature intolerance. Hematologic/Lymphatic: No abnormal bruising or bleeding, blood clots or swollen lymph nodes. Allergic/Immunologic: No nasal congestion or hives. PHYSICAL EXAM:      BP (!) 157/95   Pulse 55   Temp 97.1 °F (36.2 °C) (Oral)   Resp 14   Ht 5' 9\" (1.753 m)   Wt 213 lb 6.5 oz (96.8 kg)   SpO2 96%   BMI 31.51 kg/m²    Constitutional and General Appearance: alert, cooperative, no distress and appears stated age  HEENT: PERRL, no cervical lymphadenopathy. No masses palpable. Normal oral mucosa  Respiratory:  Normal excursion and expansion without use of accessory muscles  Resp Auscultation: Good respiratory effort. No for increased work of breathing. On auscultation: clear to auscultation bilaterally  Cardiovascular:  Heart tones are crisp and normal. regular S1 and S2.  Jugular venous pulsation Normal  The carotid upstroke is normal in amplitude and contour without delay or bruit   Abdomen:   soft  Bowel sounds present  Extremities:   No edema  Neurological:  Alert and oriented.       DATA:    Diagnostics:    EKG:   Narrative & Impression    Sinus bradycardia  Otherwise normal ECG  No previous ECGs available      Specimen Collected: 07/29/22 17:18 EDT      . ECHO: not obtained. Ejection fraction: %  Stress Test: not obtained. Cardiac Angiography: not obtained. Labs:     CBC:   Recent Labs     07/30/22  0050 08/01/22  0102   WBC 8.8 8.7   HGB 13.8 14.9   HCT 39.7* 44.4    240     BMP:   Recent Labs     07/31/22  0920 08/01/22  0102    138   K 4.3 3.7   CO2 27 22   BUN 12 12   CREATININE 0.89 0.86   LABGLOM >60 >60   GLUCOSE 119* 107*     BNP: No results for input(s): BNP in the last 72 hours. PT/INR: No results for input(s): PROTIME, INR in the last 72 hours. APTT:No results for input(s): APTT in the last 72 hours. CARDIAC ENZYMES:No results for input(s): CKTOTAL, CKMB, CKMBINDEX, TROPONINI in the last 72 hours. FASTING LIPID PANEL:  Lab Results   Component Value Date/Time    HDL 62 07/30/2022 04:32 AM    TRIG 85 07/30/2022 04:32 AM     LIVER PROFILE:No results for input(s): AST, ALT, LABALBU in the last 72 hours. 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 (HCC)    Lateral medullary syndrome    Numbness       RECOMMENDATIONS:  Will plan for JENNIFER /loop recorder - risk and benefits explained to patient and  agreed on JENNIFER but unsure of the loop recorder placement stated he might sign himself AMEDER late afternoon, writer advised on the importance of loop recorder to monitor arrhythmia and  if stable and no complication and no bleeding can be discharged later on from cardiology standpoint      Discussed with patient and Nurse    Eliane Enriquez, APRN - NP    Emma Ruiz 835 [phone]       I reviewed the patient history personally, and examined by me during the visit.   I have reviewed the H&P/Consult / Progress note as completed, and made appropriate changes to the patient exam and treatment plans.       I have reviewed the case in details including physical exam and treatment plan with resident / fellow / NP    Patient treatment plan was explained to patient, correction in notes was made as appropriate, and discussed final arrangement based on  my evaluation and exam.    Additional Recommendations:      MD [facility]

## 2022-08-01 NOTE — PROGRESS NOTES
Patient was here for JENNIFER/ILR  Unable to pass JENNIFER probe due to dysphagia. Will cancel JENNIFER. Will do ILR   2d ECHO with bubble study ordered.      Miles Hillman   CV fellow

## 2022-08-01 NOTE — PROGRESS NOTES
Discharge instructions reviewed with patient and his wife. All questions answered.    Electronically signed by Bart Garcia RN on 8/1/2022 at 6:03 PM

## 2022-08-01 NOTE — PROCEDURES
[facility]                Procedure Note  LOOP RECORDER IMPLANT      Ethelene Bamberger (55 y.o., male)  1970      8/1/2022      Procedure: Loop Recorder Implant    Operators:  Primary: Dr. Louis Rod (Attending physician )    Assistant/CV Fellow: Khanh Chau     Pre Procedure Conscious Sedation Data:    ASA Class:    [] I [] II [x] III [] IV    Mallampati Class:  [] I [x] II [] III [] IV    Indication:  CVA / Arrhythmia    Device / Data:       Model # Serial #   Recorder [id number]       Sedation monitoring  Loop recorder Implant          Procedure  After the usual preparation of the left neck and chest, the patient was draped in the usual sterile manner. Local anesthesia was infused below the left clavicle from the midline laterally. An incision was made below the left breast, lateral to midline. The incision was dilated. The Loop recorder (Reveal) System was advanced using the standard techniques, and positioned successfully with no bleeding or compliaction    Impression / Device:  Successful Implantation of: Reveal / Loop Recorder  Estimated blood loss less than 5 ml     Plan:    Wound check at [facility] in 7days  Discharge if patient remains stable        Electronically signed by Khanh Chau MD on 8/1/2022 at 2:11 PM      I was present during entire procedure and performed all critical elements of the procedure.     Gautam Ordaz MD

## 2022-08-02 NOTE — DISCHARGE SUMMARY
Neurology Discharge Summary     Patient ID: Yamilex Parham  :  1970   MRN: [id number]     ACCOUNT:  [de-identified]   Patient's PCP: No primary care provider on file. 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 male who was admitted for the management of  Stroke-like symptoms , presented to ER with dysarthria, dysphagia and left arm numbness, sudden onset. Found to have right lateral medullary infarct on MRI brain with minor chronic periventricular small vessel ischemia . CTA head and neck no large vessel occlusion. Evaluated by speech therapy and cleared for regular diet. He went for JENNIFER, unable to pass probe due to dysphagia. Loop recorder was placed. ECHO EF>55%, mild AR. Patient with improved/resolved neurologic deficit, discharged on aspirin, plavix, atorvastatin, metoprolol and HCTZ. Significant Diagnostic Studies:   Labs / Micro:    Cholesterol 166 , LDL 87 , TG 85 , Hga1c 5.2    Radiology:    ECHO Complete 2D W Doppler W Color    Result Date: 2022  Transthoracic Echocardiography Report (TTE)    Summary Normal LV size and wall thickness. No obvious wall motion abnormality seen. Normal LV systolic function with LVEF >55%. Normal RV size and function. LA and RA appears normal in size.  No obvious significant structural valvular abnormality noted. Mild AR noted. Normal aortic root dimension. No significant pericardial effusion noted. No obvious intra-cardiac mass or shunt noted. Negative bubble study IVC normal diameter and inspiratory variation indicating normal RA filling pressure. XR CHEST (2 VW)    Result Date: 7/29/2022    No acute cardiopulmonary disease     CT HEAD WO CONTRAST    Result Date: 7/29/2022      1. No acute intracranial abnormality on noncontrast CT. 2. No large vessel occlusion in the head or neck. 3. Right vocal cord medialization could indicate paralysis. 4. Nonspecific mildly enlarged left submental lymph node. Critical results on the noncontrast CT were called by Dr. Karishma Lopes MD to Mara Powers on 7/29/2022 at 17:18. CTA HEAD NECK W CONTRAST    Result Date: 7/29/2022  E1. No acute intracranial abnormality on noncontrast CT. 2. No large vessel occlusion in the head or neck. 3. Right vocal cord medialization could indicate paralysis. 4. Nonspecific mildly enlarged left submental lymph node. Critical results on the noncontrast CT were called by Dr. Karishma Lopes MD to Mara Powers on 7/29/2022 at 17:18. MRI BRAIN WO CONTRAST    Result Date: 7/30/2022    Acute lacunar infarct involving the right lateral medulla without hemorrhage or significant mass effect. Correlate with concerns for lateral medullary syndrome, which could also explain suspected right vocal cord paralysis. Consultations:    Consults:     Final Specialist Recommendations/Findings:   IP CONSULT TO NEUROLOGY  IP CONSULT TO CARDIOLOGY  IP CONSULT TO CARDIOLOGY      The patient was seen and examined on day of discharge and this discharge summary is in conjunction with any daily progress note from day of discharge. Discharge plan:       Disposition: Home    Physician Follow Up:      [facility]  [address] [place] [id number]  [phone]  Follow up in 8 week(s)  For loop recorder management    Marck Greene MD  [address] [place] [address], [facility] [place] [address] [place] [address] # [address]   [phone]    Schedule an appointment as soon as possible for a visit in 1 week(s)         Requiring Further Evaluation/Follow Up POST HOSPITALIZATION/Incidental Findings:   Diet: regular diet and cardiac diet    Activity: As tolerated    Instructions to Patient:  - Take all medications as prescribed  - Follow up with PCP   - In case of any worsening condition please visit Emergency Room. Restrictions: Driving Yes, Swimming No, Operating heavy machinery Yes, Compromising heights No      Discharge Medications:      Medication List        START taking these medications      aspirin 81 MG EC tablet  Take 1 tablet by mouth in the morning. atorvastatin 80 MG tablet  Commonly known as: LIPITOR  Take 1 tablet by mouth nightly     clopidogrel 75 MG tablet  Commonly known as: PLAVIX  Take 1 tablet by mouth in the morning for 19 doses. hydroCHLOROthiazide 25 MG tablet  Commonly known as: HYDRODIURIL  Take 1 tablet by mouth in the morning. CONTINUE taking these medications      metoprolol succinate 50 MG extended release tablet  Commonly known as: TOPROL XL               Where to Get Your Medications        These medications were sent to [id number] - [facility] [phone] - F [phone]  [address], [facility] [address]      Phone: [phone]   aspirin 81 MG EC tablet  atorvastatin 80 MG tablet  clopidogrel 75 MG tablet  hydroCHLOROthiazide 25 MG tablet         Time Spent on discharge is  31 mins in patient examination, evaluation, counseling as well as medication reconciliation, prescriptions for required medications, discharge plan and follow up. Electronically signed by     Traci Malone MD  Internal Medicine Resident, PGY- [address], [place]  8/2/2022, 6:51 AM        Thank you Dr. Misti Barksdale primary care provider on file. for the opportunity to be involved in this patient's care.

## 2022-08-02 NOTE — CARE COORDINATION
..Stroke Follow Up Phone Call    Called phone number on record for discharge follow up- No answer.      [x]Attempt #1    []Attempt #2 (final attempt)      Electronically signed by Kylah Kuo RN on 8/2/22 at 4:01 PM EDT

## 2022-08-03 NOTE — CARE COORDINATION
..Stroke Follow Up Phone Call    Called phone number on record - No answer.      []Attempt #1    [x]Attempt #2 (final attempt)      Electronically signed by Osei Rose RN on 8/3/22 at 2:33 PM EDT

## 2022-08-10 ENCOUNTER — OFFICE VISIT (OUTPATIENT)
Dept: NEUROLOGY | Age: 52
End: 2022-08-10
Payer: COMMERCIAL

## 2022-08-10 VITALS
SYSTOLIC BLOOD PRESSURE: 181 MMHG | DIASTOLIC BLOOD PRESSURE: 116 MMHG | BODY MASS INDEX: 28.77 KG/M2 | WEIGHT: 201 LBS | HEIGHT: 70 IN | HEART RATE: 72 BPM

## 2022-08-10 DIAGNOSIS — I10 HYPERTENSION, UNSPECIFIED TYPE: ICD-10-CM

## 2022-08-10 DIAGNOSIS — I63.9 BRAINSTEM INFARCTION (HCC): Primary | ICD-10-CM

## 2022-08-10 PROCEDURE — 3017F COLORECTAL CA SCREEN DOC REV: CPT | Performed by: PSYCHIATRY & NEUROLOGY

## 2022-08-10 PROCEDURE — 1036F TOBACCO NON-USER: CPT | Performed by: PSYCHIATRY & NEUROLOGY

## 2022-08-10 PROCEDURE — 99214 OFFICE O/P EST MOD 30 MIN: CPT | Performed by: PSYCHIATRY & NEUROLOGY

## 2022-08-10 PROCEDURE — G8427 DOCREV CUR MEDS BY ELIG CLIN: HCPCS | Performed by: PSYCHIATRY & NEUROLOGY

## 2022-08-10 PROCEDURE — 1111F DSCHRG MED/CURRENT MED MERGE: CPT | Performed by: PSYCHIATRY & NEUROLOGY

## 2022-08-10 PROCEDURE — G8417 CALC BMI ABV UP PARAM F/U: HCPCS | Performed by: PSYCHIATRY & NEUROLOGY

## 2022-08-10 RX ORDER — METOPROLOL SUCCINATE 100 MG/1
100 TABLET, EXTENDED RELEASE ORAL DAILY
Qty: 30 TABLET | Refills: 3 | Status: SHIPPED | OUTPATIENT
Start: 2022-08-10

## 2022-08-10 ASSESSMENT — ENCOUNTER SYMPTOMS
GASTROINTESTINAL NEGATIVE: 1
ALLERGIC/IMMUNOLOGIC NEGATIVE: 1
RESPIRATORY NEGATIVE: 1
EYES NEGATIVE: 1

## 2022-08-10 NOTE — PROGRESS NOTES
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 The condition is he is on plavix 75 mg po qd , aspirin 81 mg po qd and lipitor 80 mg po qd. Blood pressure today in office is 181/116 . He reports to have numbnes in left hand and forearm up to  elbow and left leg knee down  . Swallowing issues have improved with occasional trouble swallowing once per day with solids. There has been resolution of slurring . He does have sleep using nasal CPAP with mild residual fatigue . 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 47 yo male with left arm numbness dysarthria along with some trouble swallowing with hypertensive urgency blood pressure 190 range . 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 CTA head and neck no large vessel occlusion . He went for JENNIFER today unable to pass probe down . Loop recorder was placed . He was seen by speech therapy passing swallow study. He went for cardiac 2 D echo this evening EF > 55 % . Mild AR. He is ambulating independently with mild imbalance with no falling  . Cholesterol 166 , LDL 87 , TG 85 , Hga1c 5.2 . He is on HCTZ toprol  mg po qd . 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 CTA head and neck no large vessel occlusion . cardiac 2 D echo this evening EF > 55 % . Mild AR. Cholesterol 166 , LDL 87 , TG 85 , Hga1c 5.2 . Past Medical History:   Diagnosis Date    Hearing loss     Hypertension     Sleep apnea        Past Surgical History:   Procedure Laterality Date    HERNIA REPAIR  1973    TONSILLECTOMY  2004       No family history on file.     Social History     Socioeconomic History    Marital status:      Spouse name: None    Number of children: None    Years of education: None    Highest education level: None   Tobacco Use    Smoking status: Never    Smokeless tobacco: Never   Vaping Use    Vaping Use: Never used   Substance and Sexual Activity    Alcohol use: Yes     Alcohol/week: 3.0 standard drinks     Types: 3 Glasses of wine per week    Drug use: Never       Current Outpatient Medications   Medication Sig Dispense Refill    Multiple Vitamin (MULTIVITAMIN ADULT PO) Take by mouth      metoprolol succinate (TOPROL XL) 100 MG extended release tablet Take 1 tablet by mouth in the morning. 30 tablet 3    aspirin 81 MG EC tablet Take 1 tablet by mouth in the morning. 30 tablet 3    atorvastatin (LIPITOR) 80 MG tablet Take 1 tablet by mouth nightly 30 tablet 3    clopidogrel (PLAVIX) 75 MG tablet Take 1 tablet by mouth in the morning for 19 doses. 30 tablet 3    hydroCHLOROthiazide (HYDRODIURIL) 25 MG tablet Take 1 tablet by mouth in the morning. 30 tablet 3    metoprolol succinate (TOPROL XL) 50 MG extended release tablet Take 50 mg by mouth in the morning. No current facility-administered medications for this visit. No Known Allergies      Review of Systems     Vitals:    08/10/22 0919   BP: (!) 181/116   Pulse: 72     weight: 201 lb (91.2 kg)      Review of Systems   Constitutional: Negative. HENT: Negative. Eyes: Negative. Respiratory: Negative. Cardiovascular: Negative. Gastrointestinal: Negative. Endocrine: Negative. Genitourinary: Negative. Musculoskeletal: Negative. Skin: Negative. Allergic/Immunologic: Negative. Neurological:  Positive for numbness. Hematological: Negative. Psychiatric/Behavioral: Negative. Neurological Examination  Constitutional .General exam well groomed   Head/Ears /Nose/Throat: external ear . Normal exam  Neck and thyroid . Normal size.  No bruits  Respiratory Adina [place] Breathsounds clear bilaterally  Cardiovascular: Auscultation of heart with regular rate and rhythm  Musculoskeletal. Muscle bulk and tone normal                                                           Muscle strength 5/5 strength throughout                                                                               No dysmetria or dysdiadokinesis  No tremor   Normal fine motor  Gait mild imbalance   Orientation Alert and oriented x 3   Attention and concentration normal  Short term memory normal  Language process and speech normal . No aphasia   Cranial nerve 2 normal acuety and visual fields  Cranial nerve 3, 4 and 6 . Extraocular muscles are intact . Pupils are equal and reactive   Cranial nerve 5 . Normal strength of masseter and temporalis . Intact corneal reflex. Normal facial sensation  Cranial nerve 7 normal exam   Cranial nerve 8. Grossly intact hearing   Cranial nerve 9 and 10. Symmetric palate elevation   Cranial nerve 11 , 5 out of 5 strength   Cranial Nerve 12 midline tongue . No atrophy  Sensation . Normal proprioception . Intact Vibration . Normal pinprick and light touch   Deep Tendon Reflexes normal  Plantar response flexor bilaterally      ASSESSMENT/PLAN      Diagnosis Orders   1. Brainstem infarction (HCC)  Cardiolipin Antibodies IgG & IgM    Lupus Anticoagulant    Protein S, total and free    Homocysteine    Antithrombin 3 Antigen    Factor 5 Activity    Prothrombin Gene Mutation    Protein C Antigen, Total    AFL - Morro Reddy MD, Cardiology, [place]    OT eval and treat    External Referral To Physical Therapy    External Referral To Physical Therapy      2. Hypertension, unspecified type        He is to increase toprol XL to 100 mg po qd staying on HCTZ for blood pressure . He is to undergo coagulopathy bloodwork seeing cardiology to interrogate loop recorder undergoing coagulopathy bloodwork . He is to stay off work for one month to undergo PTand OT .  He is to take aspirn 81 mg po qd and plavix 75 mg po qd for 3 weeks then aspirin 81 mg po qd staying on lipitor 80 mg po qd     Orders Placed This Encounter   Procedures    Cardiolipin Antibodies IgG & IgM     Standing Status:   Future     Standing Expiration Date:   8/10/2023    Lupus Anticoagulant     Standing Status:   Future     Standing Expiration Date:   8/10/2023    Protein S, total and free     Standing Status:   Future     Standing Expiration Date:   8/10/2023    Homocysteine     Standing Status:   Future     Standing Expiration Date:   8/10/2023    Antithrombin 3 Antigen     Standing Status:   Future     Standing Expiration Date:   8/10/2023    Factor 5 Activity     Standing Status:   Future     Standing Expiration Date:   8/10/2023    Prothrombin Gene Mutation     Standing Status:   Future     Standing Expiration Date:   8/10/2023     Order Specific Question:   PC PCR Specimen     Answer:   blood    Protein C Antigen, Total     Standing Status:   Future     Standing Expiration Date:   8/10/2023    BATSHEVA Harris MD, Cardiology, [place]     Referral Priority:   Routine     Referral Type:   Eval and Treat     Referral Reason:   Specialty Services Required     Referred to Provider:   Maria Esther Mota MD     Requested Specialty:   Internal Medicine Cardiovascular Disease     Number of Visits Requested:   1    External Referral To Physical Therapy     Referral Priority:   Routine     Referral Type:   Eval and Treat     Referral Reason:   Specialty Services Required     Requested Specialty:   Physical Therapist     Number of Visits Requested:   1    External Referral To Physical Therapy     Referral Priority:   Routine     Referral Type:   Eval and Treat     Referral Reason:   Specialty Services Required     Requested Specialty:   Physical Therapist     Number of Visits Requested:   1    OT eval and treat     Standing Status:   Future     Standing Expiration Date:   8/10/2023     Scheduling Instructions:      Dx brainstem infarction . Assess and treat

## 2022-09-06 ENCOUNTER — PATIENT MESSAGE (OUTPATIENT)
Dept: NEUROLOGY | Age: 52
End: 2022-09-06

## 2022-09-06 NOTE — LETTER
[facility]  [address] [place] [id number]  Phone: [phone]  Fax: [phone]    Phoenix Montoya MD        2022      RE:   Jerilyn Norma KATHLEEN 2022  Domi Chavez [address]      To Whom It May Concern:    Kaye Chavis is a patient under my neurologic care and he recently suffered a stroke. The stroke was secondary to hypertension and sleep apnea. Sleep apnea can worsen hypertension which thus contributed to his stroke.           Phoenix Montoya MD

## 2022-09-08 NOTE — TELEPHONE ENCOUNTER
From: Yohannes Cespedes  To: Dr. Bladimir Joseph: 9/6/2022 9:56 PM EDT  Subject: Stroke cause    Is it reasonable to assume my stroke was caused by my hypertension and my sleep apnea causes increases in my blood pressure?

## 2022-09-09 ENCOUNTER — HOSPITAL ENCOUNTER (OUTPATIENT)
Dept: PHYSICAL THERAPY | Age: 52
Setting detail: THERAPIES SERIES
Discharge: HOME | End: 2022-09-09
Payer: COMMERCIAL

## 2022-09-09 PROCEDURE — 97161 PT EVAL LOW COMPLEX 20 MIN: CPT | Performed by: PHYSICAL THERAPIST

## 2022-09-09 NOTE — PLAN OF CARE
[facility]    [x] [place]  Phone: [phone]  Fax: [phone]      [] [place]  Phone: [phone]  Fax: [phone]        To:        Patient: Ethelene Bamberger  : 1970   MRN: [id number]  Evaluation Date: 2022      Diagnosis Information:  Diagnosis: I63.9 Brain stem Infarction 22   Treatment Diagnosis: II63.9 brain stem infarction 22     Physical Therapy Certification Form  Dear Laura Wheat  The following patient has been evaluated for physical therapy services and for therapy to continue, Medicare requires monthly physician review of the treatment plan. Please review the attached evaluation and/or summary of the patient's plan of care, and verify that you agree therapy should continue by signing the attached document and sending it back to our office.     Plan of Care/Treatment to date:  [x] Therapeutic Exercise    [] Modalities:  [] Therapeutic Activity     [] Ultrasound  [] Electrical Stimulation  [x] Gait Training      [] Cervical Traction [] Lumbar Traction  [x] Neuromuscular Re-education    [] Cold/hotpack [] Iontophoresis   [x] Instruction in HEP     Other:  [] Manual Therapy      []             [] Aquatic Therapy      []                 Goals:  Short Term Goals  Time Frame for Short term goals: 1 week  Short term goal 1: Start HEP    Long Term Goals  Time Frame for Long term goals : 4 weeks  Long term goal 1: TUG improve to 6 sec for normal gait pace  Long term goal 2: Calderon Balance Scale improve to 55/56 for balance control  Long term goal 3: Front , lateral, pivot lunge on L to be 1\" deficit or less, compared to R  Long term goal 4: Step up & over 8\" step with no L LE instability    Frequency/Duration:22 - 10/8/22  # Days per week: [] 1 day # Weeks: [] 1 week [] 5 weeks     [] 2 days   [] 2 weeks [] 6 weeks     [x] 3 days   [] 3 weeks [] 7 weeks     [] 4 days   [x] 4 weeks [] 8 weeks    Rehab Potential: [] Excellent [x] Good [] Fair  [] Poor     Electronically signed by:  Yumiko Darnell PT      If you have any questions or concerns, please don't hesitate to call.   Thank you for your referral.      Physician Signature:________________________________Date:__________________  By signing above, therapists plan is approved by physician

## 2022-09-09 NOTE — PROGRESS NOTES
Reviewed/Progressed HEP activities related to improving balance, coordination, kinesthetic sense, posture, motor skill, proprioception.  (07861)    Manual Treatments:    [] Provided manual therapy to mobilize soft tissue/joints for the purpose of modulating pain, promoting relaxation,  increasing ROM, reducing/eliminating soft tissue swelling/inflammation/restriction, improving soft tissue extensibility. (20181)    Service Based Modalities:  Eval 37'    Timed Code Treatment Minutes:        Total Treatment Minutes:   40'    Treatment/Activity Tolerance:  [x] Patient tolerated treatment well [] Patient limited by fatique  [] Patient limited by pain  [] Patient limited by other medical complications  [] Other:     Prognosis: [x] Good [] Fair  [] Poor    Patient Requires Follow-up: [x] Yes  [] No      Goals:  Short Term Goals  Time Frame for Short term goals: 1 week  Short term goal 1: Start HEP    Long Term Goals  Time Frame for Long term goals : 4 weeks  Long term goal 1: TUG improve to 6 sec for normal gait pace  Long term goal 2: Calderon Balance Scale improve to 55/56 for balance control  Long term goal 3: Front , lateral, pivot lunge on L to be 1\" deficit or less, compared to R  Long term goal 4: Step up & over 8\" step with no L LE instability          Plan:   [] Continue per plan of care [] Alter current plan (see comments)  [x] Plan of care initiated [] Hold pending MD visit [] Discharge  Plan for Next Session:      Electronically signed by:  Yumiko Darnell PT,PT

## 2022-09-09 NOTE — PROGRESS NOTES
Physical Therapy  Initial Assessment  Date: 2022  Patient Name: Elsie Fernandez  MRN: [id number]  : 1970    Referring Physician: MD Jaquelin Yi   PCP: Aster Dean MD     Medical Diagnosis: Cerebral infarction, unspecified [I63.9] I63.9 Brain stem Infarction 22  Treatment Diagnosis: II63.9 brain stem infarction 22      Insurance: Payor: [employer] / Plan: [address] [place] [address] / Product Type: *No Product type* /   Insurance ID: [id number] - (Commercial)      Restrictions:- none       Subjective:   General  Chart Reviewed: Yes  Patient Assessed for Rehabilitation Services: Yes  History obtained from[de-identified] Patient, Chart Review  Family/Caregiver Present: No  Diagnosis: I63.9 Brain stem Infarction 22  Referring Provider (secondary): Jaquelin Howell  Follows Commands: Within Functional Limits  General Comment  Comments: Most noted deficits were L side  PT Visit Information  Onset Date: 22  PT Insurance Information: [employer]  Subjective  Subjective: Now 6 weeks post-onset of Brainstem Infarction. Transferred to [facility], and d/c home 22. Still with some L side numbness, sensory changes.   Dominant Hand: : Right  Pain Screening  Patient Currently in Pain: No       Vision/Hearing:  Vision  Vision: Impaired  Hearing  Hearing: Within functional limits    Orientation:  Orientation  Overall Orientation Status: Within Normal Limits  Follows Commands: Within Functional Limits    Social History:  Social History  Lives With: Spouse  Type of Home: House  Home Layout: One level  Home Access: Stairs to enter without rails  Entrance Stairs - Number of Steps: 2    Functional Status:  Functional Status  Prior level of function: Independent  Occupation: Full time employment  Type of Occupation:   IADL Comments: Presently off, no RTW set yet  Receives Help From: Family  ADL Assistance: Independent  Homemaking Assistance: Independent  Ambulation Assistance: Independent  Transfer Assistance: Independent  Active : Yes  Mode of Transportation: Car  Additional Comments: Loss of awareness of L leg on steps    Objective:     AROM RLE (degrees)  RLE AROM: WNL  AROM LLE (degrees)  LLE AROM : WNL  AROM RUE (degrees)  RUE AROM : WNL  AROM LUE (degrees)  LUE AROM : WNL    Strength RLE  Strength RLE: WNL  Strength LLE  Comment: 5-/5. Strength Other  Other: L 1 leg hop deficient amplitude 50%     Additional Measures  Special Tests: Step up & over 8\" step, L LE unstable in landing phase  Other: Front lunge L deficient 4\". Lateral unge L deficient 2\". Pivot L deficient 2\"        Transfers  Sit to Stand: Independent  Comment: Sit to  30\" x11       Ambulation  Surface: level tile  Device: No Device  Assistance: Independent  Comments: TUG 8 sec.   Foam Surface  Eyes Open: 10 seconds  Sway: Minimal  Eyes Closed: 10 seconds  Sway: Minimal     Assessment:    Conditions Requiring Skilled Therapeutic Intervention  Body Structures, Functions, Activity Limitations Requiring Skilled Therapeutic Intervention: Decreased strength;Decreased balance;Decreased coordination  Therapy Prognosis: Good  Treatment Diagnosis: II63.9 brain stem infarction 7/29/22  Referring Provider (secondary): Harris  Activity Tolerance  Activity Tolerance: Patient tolerated treatment well  Activity Tolerance: Patient tolerated treatment well         Plan:    Plan  Plan weeks: 4  Current Treatment Recommendations: Strengthening, Balance training, Gait training, Neuromuscular re-education    OutComes Score:  Calderon Balance Score: 52 (09/09/22 1430)          Goals:  Short Term Goals  Time Frame for Short term goals: 1 week  Short term goal 1: Start HEP  Long Term Goals  Time Frame for Long term goals : 4 weeks  Long term goal 1: TUG improve to 6 sec for normal gait pace  Long term goal 2: Calderon Balance Scale improve to 55/56 for balance control  Long term goal 3: Front , lateral, pivot lunge on L to be 1\" deficit or less, compared to R  Long term goal 4: Step up & over 8\" step with no L LE instability       Therapy Time:   Individual Concurrent Group Co-treatment   Time In  2:00         Time Out  2:37         Minutes  37                 Martha Solano, PT

## 2022-09-12 ENCOUNTER — HOSPITAL ENCOUNTER (OUTPATIENT)
Dept: PHYSICAL THERAPY | Age: 52
Setting detail: THERAPIES SERIES
Discharge: HOME | End: 2022-09-12
Payer: COMMERCIAL

## 2022-09-12 PROCEDURE — 97110 THERAPEUTIC EXERCISES: CPT

## 2022-09-12 NOTE — PROGRESS NOTES
Physical Therapy    Physical Therapy Daily Treatment Note    Date:  2022    Patient Name:  John Trimble    :  1970  MRN: [id number]  Restrictions/Precautions:     Medical/Treatment Diagnosis Information:   Diagnosis: I63.9 Brain stem Infarction 22  Treatment Diagnosis: I63.9 brain stem infarction 63  Insurance/Certification information:  PT Insurance Information: [facility]  Physician Information:   [address] of care signed (Y/N):  n  Visit# / total visits:   Pain level: 0/10       Time In: 1:43   Time Out: 2:17    Progress Note: [x]  Yes  []  No  Next due by: Visit #12, or 10/8/22      Subjective: Patient is experiencing tingling sensation in LUE and LLE, this has been consistent. Ran 1 mile before session this date. At times his LLE will \"fail\" him and give out. Objective: Patient most challenged by foam exercises. Noted which exercises were easy for patient and will need further progressions next session. Patient is very active, discussed performing ankle 4-way at home with therband to increase L ankle strength during SLS.    Observation:   Test measurements:      Exercises:   Exercise/Equipment Resistance/Repetitions Other comments   Counter ex on foam 15x No UE support   Squat Matrix 9 position, x10 On foam   Lunges x10 Front, lat, pivot   Step up & over 8\" x10    1/2 kneel & up 10x No hands   Toe/ heel walk 2 laps     Lateral walk 1 lap easy   Cross-over walk 1 lap  easy   [place] walk 1 lap easy         Wall Matrix     Tandem balance 2x20\" foam   SLS 2x20\" Cannot perform on foam   Tandem walking     [x] Provided verbal/tactile cueing for activities related to strengthening, flexibility, endurance, ROM. (90433)  [x] Provided verbal/tactile cueing for activities related to improving balance, coordination, kinesthetic sense, posture, motor skill, proprioception. (10849)    Therapeutic Activities:     [] Therapeutic activities, direct (one-on-one) patient contact (use of dynamic activities to improve functional performance). (40573)    Gait:   [] Provided training and instruction to the patient for ambulation re-education. (43319)    Self-Care/ADL's  [] Self-care/home management training and compensatory training, meal preparation, safety procedures, and instructions in use of assistive technology devices/adaptive equipment, direct one-on-one contact. (87529)    Home Exercise Program:     [] Reviewed/Progressed HEP activities related to strengthening, flexibility, endurance, ROM. (77642)  [] Reviewed/Progressed HEP activities related to improving balance, coordination, kinesthetic sense, posture, motor skill, proprioception.  (67360)    Manual Treatments:    [] Provided manual therapy to mobilize soft tissue/joints for the purpose of modulating pain, promoting relaxation,  increasing ROM, reducing/eliminating soft tissue swelling/inflammation/restriction, improving soft tissue extensibility. (85124)    Service Based Modalities:  29'    Timed Code Treatment Minutes:        Total Treatment Minutes:   29'    Treatment/Activity Tolerance:  [x] Patient tolerated treatment well [] Patient limited by fatique  [] Patient limited by pain  [] Patient limited by other medical complications  [] Other:     Prognosis: [x] Good [] Fair  [] Poor    Patient Requires Follow-up: [x] Yes  [] No      Goals:  Short Term Goals  Time Frame for Short term goals: 1 week  Short term goal 1: Start HEP    Long Term Goals  Time Frame for Long term goals : 4 weeks  Long term goal 1: TUG improve to 6 sec for normal gait pace  Long term goal 2: Caledron Balance Scale improve to 55/56 for balance control  Long term goal 3: Front , lateral, pivot lunge on L to be 1\" deficit or less, compared to R  Long term goal 4: Step up & over 8\" step with no L LE instability          Plan:   [] Continue per plan of care [] Alter current plan (see comments)  [x] Plan of care initiated [] Hold pending MD visit [] Discharge  Plan for Next Session:      Electronically signed by:  Clare Chan PTA

## 2022-09-13 NOTE — PROGRESS NOTES
I have reviewed and agree to the content of the note written by the PTA.   Electronically signed by Shanta Tian PT [id number]

## 2022-09-14 ENCOUNTER — HOSPITAL ENCOUNTER (OUTPATIENT)
Dept: PHYSICAL THERAPY | Age: 52
Setting detail: THERAPIES SERIES
Discharge: HOME | End: 2022-09-14
Payer: COMMERCIAL

## 2022-09-14 NOTE — TELEPHONE ENCOUNTER
Message to nurse . Let patient know that is likely scenario stroke from HTN small vessel with blood pressure worsened with sleep apnea . He should use nasal CPAP on nightly basis .  Also blood slip was give in vit to undergo coagulopathy bloodwork which he did not do

## 2022-09-14 NOTE — PROGRESS NOTES
Physical Therapy  Outpatient Physical Therapy    [] [place]  Phone: [phone]  Fax: [phone]      [] [place]  Phone: [phone]  Fax: [phone]    Physical Therapy  Cancellation/No-show Note  Patient Name:  Todd Elizabeth  :  1970   Date:  2022  Cancelled visits to date: 1  No-shows to date: 0    For today's appointment patient:  [x]  Cancelled  []  Rescheduled appointment  []  No-show     Reason given by patient:  []  Patient ill  []  Conflicting appointment  []  No transportation    []  Conflict with work  []  No reason given  []  Other:     Comments:      Electronically signed by: Inda Klinefelter, PTA

## 2022-09-19 ENCOUNTER — HOSPITAL ENCOUNTER (OUTPATIENT)
Dept: PHYSICAL THERAPY | Age: 52
Setting detail: THERAPIES SERIES
Discharge: HOME | End: 2022-09-19
Payer: COMMERCIAL

## 2022-09-19 PROCEDURE — 97110 THERAPEUTIC EXERCISES: CPT

## 2022-09-19 NOTE — PROGRESS NOTES
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 No weakness noted in LLE. Still has tingling but relates it being from the stroke. Notes he is not 100% back because of tingling/numbness but is not unable to perform any functional tasks. No concerns for return to work. Objective: Patient performed all exercises below with ease. Addressed all goals with no issues reported. Patient has no further concerns and would like to be discharged.    Observation:   Test measurements:      Exercises:   Exercise/Equipment Resistance/Repetitions Other comments   Counter ex on foam 15x No UE support   Squat Matrix on BOSU 6 position, x10    Lunges on BOSU x10 Front, lat, pivot   Step up & over 8\" x10    1/2 kneel & up 10x No hands   Toe/ heel walk 2 laps     Lateral walk 1 lap No challenge    Cross-over walk 1 lap  No challenge   [place] walk 1 lap No challenge         Wall Matrix     Tandem balance 2x20\" foam   SLS 2x20\" Cannot perform on foam   Tandem walking     [x] Provided verbal/tactile cueing for activities related to strengthening, flexibility, endurance, ROM. (16317)  [x] Provided verbal/tactile cueing for activities related to improving balance, coordination, kinesthetic sense, posture, motor skill, proprioception. (72528)    Therapeutic Activities:     [] Therapeutic activities, direct (one-on-one) patient contact (use of dynamic activities to improve functional performance). (36240)    Gait:   [] Provided training and instruction to the patient for ambulation re-education. (92808)    Self-Care/ADL's  [] Self-care/home management training and compensatory training, meal preparation, safety procedures, and instructions in use of assistive technology devices/adaptive equipment, direct one-on-one contact. (34100)    Home Exercise Program:     [] Reviewed/Progressed HEP activities related to strengthening, flexibility, endurance, ROM. (37020)  [] Reviewed/Progressed HEP activities related to improving balance, coordination, kinesthetic sense, posture, motor skill, proprioception.  (52593)    Manual Treatments:    [] Provided manual therapy to mobilize soft tissue/joints for the purpose of modulating pain, promoting relaxation,  increasing ROM, reducing/eliminating soft tissue swelling/inflammation/restriction, improving soft tissue extensibility. (90378)    Service Based Modalities:  30'    Timed Code Treatment Minutes:        Total Treatment Minutes:   30'    Treatment/Activity Tolerance:  [x] Patient tolerated treatment well [] Patient limited by fatique  [] Patient limited by pain  [] Patient limited by other medical complications  [] Other:     Prognosis: [x] Good [] Fair  [] Poor    Patient Requires Follow-up: [x] Yes  [] No      Goals:  Short Term Goals  Time Frame for Short term goals: 1 week  Short term goal 1: Start HEP    Long Term Goals  Time Frame for Long term goals : 4 weeks  Long term goal 1: TUG improve to 6 sec for normal gait pace (completed in 5 seconds)  Long term goal 2: Calderon Balance Scale improve to 55/56 for balance control (56/56 on 9/19/22)  Long term goal 3: Front , lateral, pivot lunge on L to be 1\" deficit or less, compared to R (no deficits present)  Long term goal 4: Step up & over 8\" step with no L LE instability (no instability)      Plan:   [] Continue per plan of care [] Alter current plan (see comments)  [x] Plan of care initiated [] Hold pending MD visit [] Discharge  Plan for Next Session:  discharge    Electronically signed by:  Denise Dee PTA

## 2022-09-20 NOTE — PROGRESS NOTES
I have reviewed and agree to the content of the note written by the PTA.   Electronically signed by Ivonne Number PT 57

## 2022-09-25 NOTE — TELEPHONE ENCOUNTER
Message to nurse . Please reorder blood work from last office visit adding lipid profile . Dx cerebral infarction .  Also leas write letter with letterhead that he had stroke secondary to hypertension and sleep anea

## 2022-10-03 DIAGNOSIS — I63.9 BRAINSTEM INFARCTION (HCC): ICD-10-CM

## 2022-10-03 PROCEDURE — 36415 COLL VENOUS BLD VENIPUNCTURE: CPT | Performed by: PSYCHIATRY & NEUROLOGY

## 2022-10-04 ENCOUNTER — HOSPITAL ENCOUNTER (OUTPATIENT)
Dept: PHYSICAL THERAPY | Age: 52
Setting detail: THERAPIES SERIES
Discharge: HOME | End: 2022-10-04
Payer: COMMERCIAL

## 2022-10-04 ENCOUNTER — HOSPITAL ENCOUNTER (OUTPATIENT)
Dept: OCCUPATIONAL THERAPY | Age: 52
Setting detail: THERAPIES SERIES
Discharge: HOME | End: 2022-10-04
Payer: COMMERCIAL

## 2022-10-04 PROCEDURE — 97750 PHYSICAL PERFORMANCE TEST: CPT | Performed by: OCCUPATIONAL THERAPIST

## 2022-10-04 NOTE — PROGRESS NOTES
Occupational Therapy  Occupational Therapy Initial Assessment  Date:  10/4/2022    Patient Name: Jose Harris  MRN: [id number]     :  1970     Treatment Diagnosis: Brainstem Infarct     Subjective:   General  Chart Reviewed: Yes  Patient assessed for rehabilitation services?: Yes  History obtained from[de-identified] Patient, Chart Review  Family/Caregiver Present: No  Diagnosis: Brainstem infarct  Referring Provider (secondary): Priscila Scott,  Antonio Commands: Within Functional Limits  OT Visit Information  Onset Date: 22  Subjective  Prior diagnostic testing[de-identified] MRI, X-ray, CT Scan  Dominant Hand: : Right    Objective:     FCE completed this date by Anthony Kirkpatrick OT and Mavis Hale PT. Report completed and sent to physician. See media for full report.   Total time spent with patient and completing full report: 8am-1pm     Assessment:    Assessment  Assessment: FCE  Treatment Diagnosis: Brainstem Infarct  REQUIRES OT FOLLOW-UP: No         Therapy Time:   Individual Concurrent Group Co-treatment   Time In           Time Out           Minutes                   JAJA NGUYEN OTR, OT

## 2022-10-11 ENCOUNTER — TELEPHONE (OUTPATIENT)
Dept: NEUROLOGY | Age: 52
End: 2022-10-11

## 2022-10-11 NOTE — TELEPHONE ENCOUNTER
Patient phoned in requesting a letter to return to work. Patient did complete all of his physical therapy. There is a CHRISTIANSON Balance Scale from his physical therapist in [place]. I did ask the patient to contact his therapist to fax to us his completed evaluation that PT signed off on.

## 2022-10-11 NOTE — TELEPHONE ENCOUNTER
Message to nurse .  Please check with patient and see what work he does so we can write release to work note

## 2022-10-12 ENCOUNTER — TELEPHONE (OUTPATIENT)
Dept: NEUROLOGY | Age: 52
End: 2022-10-12

## 2022-10-12 NOTE — TELEPHONE ENCOUNTER
Work Capacity from [employer] needing Dr Isabela Turner. paperwork was received. Blank copy of form has been scanned.

## 2022-10-12 NOTE — TELEPHONE ENCOUNTER
Per patient  message in My Chart. I'm a . 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

## 2022-10-13 ENCOUNTER — E-VISIT (OUTPATIENT)
Dept: FAMILY MEDICINE CLINIC | Age: 52
End: 2022-10-13

## 2022-10-13 NOTE — PROGRESS NOTES
Patient is made aware through a message that I am not his PCP and had only seen him briefly in the urgent care setting before transferring him to the ER for care with an acute CVA. Advised him he will need to establish with a new primary care provider to address his chronic health care issues.

## 2022-10-14 NOTE — TELEPHONE ENCOUNTER
Talked with Mr Emile Bledsoe. He will RTW on 10/17/22. Form corrected. Will upload into chart for him to print off.

## 2022-10-14 NOTE — TELEPHONE ENCOUNTER
I called [facility] to confirm if he needs letter and the form that PT had sent over. I also sent him a message in My Chart. Dr. Jono Pierson has signed the form but await Mr. Zachery Sow response.

## 2022-10-17 NOTE — TELEPHONE ENCOUNTER
[facility] did get the RTW form. Needs the note about his stroke still. Dr. Dustin Barr is out this week on hospital rounds but will get his signature when he is available.

## 2022-10-26 NOTE — TELEPHONE ENCOUNTER
Message to nurse Tani Dudley write note and send to patient hat he has had stroke attribute to hypertension and precipitant being diagnosis of sleep apnea

## 2022-11-03 NOTE — DISCHARGE SUMMARY
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  Treatment Diagnosis: I63.9 brain stem infarction      Insurance/Certification information:   [address]  Physician Information:   Brittani Arambula  Plan of care signed (Y/N):   Visit# / total visits:  3  Pain level:        Plan of Care/Treatment to date:  [x] Therapeutic Exercise    [] Modalities:  [] Therapeutic Activity     [] Ultrasound  [] Electrical Stimulation  [] Gait Training      [] Cervical Traction    [] Lumbar Traction  [x] Neuromuscular Re-education  [] Cold/hotpack [] Iontophoresis  [x] Instruction in HEP      Other:  [] Manual Therapy       []    [] Aquatic Therapy       []                              Subjective:   Is not unable to perform any functional tasks. No concerns for return to work.           Objective:   No balance deficits  5/5 strength            Plan:    D/C       Goals:   Short Term Goals  Time Frame for Short term goals: 1 week  Short term goal 1: Start HEP     Long Term Goals  Time Frame for Long term goals : 4 weeks  Long term goal 1: TUG improve to 6 sec for normal gait pace (completed in 5 seconds)  Long term goal 2: Calderon Balance Scale improve to 55/56 for balance control (56/56 on 22)  Long term goal 3: Front , lateral, pivot lunge on L to be 1\" deficit or less, compared to R (no deficits present)  Long term goal 4: Step up & over 8\" step with no L LE instability (no instability)         Percentage of Goals Met: 100            Discharge Prognosis: [] Excellent [x] Good [] Fair  [] Poor     Goal Status:  [x] Achieved [] Partially Achieved  [] Not Achieved       Electronically signed by:  Kamron Ervin, PT

## 2023-01-10 ENCOUNTER — TELEPHONE (OUTPATIENT)
Dept: NEUROLOGY | Age: 53
End: 2023-01-10

## 2023-01-10 ENCOUNTER — OFFICE VISIT (OUTPATIENT)
Dept: NEUROLOGY | Age: 53
End: 2023-01-10
Payer: COMMERCIAL

## 2023-01-10 ENCOUNTER — PATIENT MESSAGE (OUTPATIENT)
Dept: NEUROLOGY | Age: 53
End: 2023-01-10

## 2023-01-10 VITALS
DIASTOLIC BLOOD PRESSURE: 78 MMHG | BODY MASS INDEX: 30.78 KG/M2 | SYSTOLIC BLOOD PRESSURE: 127 MMHG | HEART RATE: 80 BPM | WEIGHT: 215 LBS | HEIGHT: 70 IN

## 2023-01-10 DIAGNOSIS — I63.9 BRAINSTEM INFARCTION (HCC): Primary | ICD-10-CM

## 2023-01-10 DIAGNOSIS — I48.91 ATRIAL FIBRILLATION, UNSPECIFIED TYPE (HCC): ICD-10-CM

## 2023-01-10 DIAGNOSIS — G47.33 OBSTRUCTIVE SLEEP APNEA SYNDROME: ICD-10-CM

## 2023-01-10 DIAGNOSIS — I10 HYPERTENSION, UNSPECIFIED TYPE: ICD-10-CM

## 2023-01-10 PROCEDURE — G8427 DOCREV CUR MEDS BY ELIG CLIN: HCPCS | Performed by: PSYCHIATRY & NEUROLOGY

## 2023-01-10 PROCEDURE — 3017F COLORECTAL CA SCREEN DOC REV: CPT | Performed by: PSYCHIATRY & NEUROLOGY

## 2023-01-10 PROCEDURE — 1036F TOBACCO NON-USER: CPT | Performed by: PSYCHIATRY & NEUROLOGY

## 2023-01-10 PROCEDURE — G8484 FLU IMMUNIZE NO ADMIN: HCPCS | Performed by: PSYCHIATRY & NEUROLOGY

## 2023-01-10 PROCEDURE — G8417 CALC BMI ABV UP PARAM F/U: HCPCS | Performed by: PSYCHIATRY & NEUROLOGY

## 2023-01-10 PROCEDURE — 99214 OFFICE O/P EST MOD 30 MIN: CPT | Performed by: PSYCHIATRY & NEUROLOGY

## 2023-01-10 PROCEDURE — 3074F SYST BP LT 130 MM HG: CPT | Performed by: PSYCHIATRY & NEUROLOGY

## 2023-01-10 PROCEDURE — 3078F DIAST BP <80 MM HG: CPT | Performed by: PSYCHIATRY & NEUROLOGY

## 2023-01-10 RX ORDER — LOSARTAN POTASSIUM 25 MG/1
25 TABLET ORAL DAILY
COMMUNITY
Start: 2022-11-18

## 2023-01-10 RX ORDER — SILDENAFIL CITRATE 20 MG/1
TABLET ORAL
COMMUNITY
Start: 2022-10-17

## 2023-01-10 RX ORDER — GABAPENTIN 100 MG/1
CAPSULE ORAL
COMMUNITY
Start: 2022-11-01

## 2023-01-10 RX ORDER — FELODIPINE 10 MG/1
TABLET, EXTENDED RELEASE ORAL
COMMUNITY
Start: 2022-10-26

## 2023-01-10 ASSESSMENT — ENCOUNTER SYMPTOMS
RESPIRATORY NEGATIVE: 1
EYES NEGATIVE: 1
GASTROINTESTINAL NEGATIVE: 1
ALLERGIC/IMMUNOLOGIC NEGATIVE: 1

## 2023-01-10 NOTE — PROGRESS NOTES
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 He is to undergo coagulopathy bloodwork seeing cardiology to interrogate loop recorder undergoing coagulopathy bloodwork . He is to stay off work for one month to undergo PTand OT . Sleep apnea on nasal CPAP . The condition is Protein S ,protein C, ,antihrombin 3 , homocysteine , Factor V Leiden, antithrombin 3 , anticardiolipin Ab ,  Antibeta glycoprotein ab , lupus anticoagulant all negative. He reports ongoing numbness in left hand  and entire left leg . There is burning feeling sensation in left foot of grade 2 over 10 . There will be ocasional choking 2 to 3 times per week more solids . He needs to be conscious when chewing and swallowing being less likely to choke . There is no weakness able to run . Blood pressure is 127/78 on plendil and hydrodiuril and cozaar  . He has been found to have atrial fibrillation through loop reorder on eliquis 5 mg po bid and plavix 75 mg po qd and lipitor 80 mg po qd  . He is on neurontin 100 mg po tid for left side burning unclear if any effect . He is using nasal CPAP on nightly basis feeling better . He has gone back to work doing work no problem . He went for JENN[employer]ER today unable to pass probe down . Loop recorder was placed . Significant medications plavix 75 mg po qd , eliquis 5 mg po bid lipitor 80 mg po qd, neurontin 100 mg po tid  . Testing MRI of Head with acute right lateral medullary infarction with minor chronic periventricular small vessel ischemia . CTA head and neck no large vessel occlusion . cardiac 2 D echo this evening EF > 55 % . Mild AR. Cholesterol 166 , LDL 87 , TG 85 , Hga1c 5.2 .  Protein S ,protein C, ,antihrombin 3  homocysteine , Factor V Leiden antithrombin 3 , anticardiolipin Ab ,  Antibeta glycoprotein ab , lupus anticoagulant all negative     Past Medical History:   Diagnosis Date    Cerebral artery occlusion with cerebral infarction [facility]) 7-28-22    Headache 8-20-22    Hearing loss Hypertension     Neuropathy 7-    Sleep apnea        Past Surgical History:   Procedure Laterality Date    HERNIA REPAIR  1973    TONSILLECTOMY  2004       Family History   Problem Relation Age of Onset    Alzheimer's Disease Maternal Grandmother        Social History     Socioeconomic History    Marital status:      Spouse name: None    Number of children: None    Years of education: None    Highest education level: None   Tobacco Use    Smoking status: Never    Smokeless tobacco: Never   Vaping Use    Vaping Use: Never used   Substance and Sexual Activity    Alcohol use: Yes     Alcohol/week: 3.0 standard drinks     Types: 3 Glasses of wine per week    Drug use: Never    Sexual activity: Yes     Partners: Female       Current Outpatient Medications   Medication Sig Dispense Refill    apixaban (ELIQUIS) 5 MG TABS tablet Take 5 mg by mouth 2 times daily      gabapentin (NEURONTIN) 100 MG capsule TAKE 1 CAPSULE BY MOUTH THREE TIMES A DAY      losartan (COZAAR) 25 MG tablet Take 25 mg by mouth daily      sildenafil (REVATIO) 20 MG tablet       ciclopirox (PENLAC) 8 % solution APPLY SMALL AMOUNT TOPICALLY ONCE DAILY FOR FUNGAL DISEASE OF THE NAILS APPLY TO AFFECTED TOE NAILS AND ADJACENT SKIN ONCE DAILY IN COMBINATION  WITH WEEKLY NAIL TRIMMING. REMOVE WITH ALCOHOL EVERY 7 DAYS; CONTINUE  THERAPY UNTIL NAIL CLEARANCE. APPLY TO AFFECTED TOE NAILS AND ADJACENT SKIN ONCE DAILY IN COMBINATION   WITH WEEKLY NAIL TRIMMING. REMOVE WITH ALCOHOL EVERY 7 DAYS; CONTINUE   THERAPY UNTIL NAIL CLEARANCE.      felodipine (PLENDIL) 10 MG extended release tablet TAKE 1 TABLET BY MOUTH EVERY DAY      Multiple Vitamin (MULTIVITAMIN ADULT PO) Take by mouth      atorvastatin (LIPITOR) 80 MG tablet Take 1 tablet by mouth nightly 30 tablet 3    clopidogrel (PLAVIX) 75 MG tablet Take 1 tablet by mouth in the morning for 19 doses.  30 tablet 3    hydroCHLOROthiazide (HYDRODIURIL) 25 MG tablet Take 1 tablet by mouth in the morning. 30 tablet 3     No current facility-administered medications for this visit. No Known Allergies      Review of Systems     Vitals:    01/10/23 0852   BP: 127/78   Pulse: 80     weight: 215 lb (97.5 kg)      Review of Systems   Constitutional: Negative. HENT: Negative. Eyes: Negative. Respiratory: Negative. Cardiovascular: Negative. Gastrointestinal: Negative. Endocrine: Negative. Genitourinary: Negative. Musculoskeletal: Negative. Skin: Negative. Allergic/Immunologic: Negative. Neurological:  Positive for numbness. Hematological: Negative. Psychiatric/Behavioral: Negative. Neurological Examination  Constitutional .General exam well groomed   Head/Ears /Nose/Throat: external ear . Normal exam  Neck and thyroid . Normal size. No bruits  Respiratory . Breathsounds clear bilaterally  Cardiovascular: Auscultation of heart with regular rate and rhythm  Musculoskeletal. Muscle bulk and tone normal                                                           Muscle strength 5/5 strength throughout                                                                               No dysmetria or dysdiadokinesis  No tremor   Normal fine motor  Gait mild imbalance   Orientation Alert and oriented x 3   Attention and concentration normal  Short term memory normal  Language process and speech normal . No aphasia   Cranial nerve 2 normal acuety and visual fields  Cranial nerve 3, 4 and 6 . Extraocular muscles are intact . Pupils are equal and reactive   Cranial nerve 5 . Normal strength of masseter and temporalis . Intact corneal reflex. Normal facial sensation  Cranial nerve 7 normal exam   Cranial nerve 8. Grossly intact hearing   Cranial nerve 9 and 10. Symmetric palate elevation   Cranial nerve 11 , 5 out of 5 strength   Cranial Nerve 12 midline tongue . No atrophy  Sensation . Normal proprioception . Intact Vibration .  Normal pinprick and light touch   Deep Tendon Reflexes normal  Plantar response flexor bilaterally      ASSESSMENT/PLAN      Diagnosis Orders   1. Brainstem infarction ([facility] [address]        2. Hypertension, unspecified type        3. Obstructive sleep apnea syndrome        4. Atrial fibrillation, unspecified type [facility]        He has been found to have atrial fibrillation now on eliquis to come off plavix if okay with cardiology .  Will wean him off neurontin     As above

## 2023-01-17 NOTE — TELEPHONE ENCOUNTER
Forms completed and faxed back with clinical documentation requested. Patient notified and given a copy via [address]

## 2023-03-24 ENCOUNTER — PATIENT MESSAGE (OUTPATIENT)
Dept: NEUROLOGY | Age: 53
End: 2023-03-24

## 2023-03-27 ENCOUNTER — OFFICE VISIT (OUTPATIENT)
Dept: NEUROLOGY | Age: 53
End: 2023-03-27
Payer: COMMERCIAL

## 2023-03-27 VITALS
HEIGHT: 70 IN | DIASTOLIC BLOOD PRESSURE: 83 MMHG | WEIGHT: 225 LBS | SYSTOLIC BLOOD PRESSURE: 130 MMHG | BODY MASS INDEX: 32.21 KG/M2 | HEART RATE: 70 BPM

## 2023-03-27 DIAGNOSIS — I63.9 BRAINSTEM INFARCTION (HCC): Primary | ICD-10-CM

## 2023-03-27 DIAGNOSIS — I48.91 ATRIAL FIBRILLATION, UNSPECIFIED TYPE (HCC): ICD-10-CM

## 2023-03-27 DIAGNOSIS — I10 HYPERTENSION, UNSPECIFIED TYPE: ICD-10-CM

## 2023-03-27 DIAGNOSIS — G47.33 OBSTRUCTIVE SLEEP APNEA SYNDROME: ICD-10-CM

## 2023-03-27 PROCEDURE — G8417 CALC BMI ABV UP PARAM F/U: HCPCS | Performed by: PSYCHIATRY & NEUROLOGY

## 2023-03-27 PROCEDURE — 99214 OFFICE O/P EST MOD 30 MIN: CPT | Performed by: PSYCHIATRY & NEUROLOGY

## 2023-03-27 PROCEDURE — G8484 FLU IMMUNIZE NO ADMIN: HCPCS | Performed by: PSYCHIATRY & NEUROLOGY

## 2023-03-27 PROCEDURE — 3079F DIAST BP 80-89 MM HG: CPT | Performed by: PSYCHIATRY & NEUROLOGY

## 2023-03-27 PROCEDURE — G8427 DOCREV CUR MEDS BY ELIG CLIN: HCPCS | Performed by: PSYCHIATRY & NEUROLOGY

## 2023-03-27 PROCEDURE — 3075F SYST BP GE 130 - 139MM HG: CPT | Performed by: PSYCHIATRY & NEUROLOGY

## 2023-03-27 PROCEDURE — 3017F COLORECTAL CA SCREEN DOC REV: CPT | Performed by: PSYCHIATRY & NEUROLOGY

## 2023-03-27 PROCEDURE — 1036F TOBACCO NON-USER: CPT | Performed by: PSYCHIATRY & NEUROLOGY

## 2023-03-27 RX ORDER — AMLODIPINE BESYLATE 10 MG/1
10 TABLET ORAL
COMMUNITY
Start: 2022-08-31

## 2023-03-27 RX ORDER — RIMEGEPANT SULFATE 75 MG/75MG
TABLET, ORALLY DISINTEGRATING ORAL
Qty: 10 TABLET | Refills: 3 | Status: SHIPPED | OUTPATIENT
Start: 2023-03-27

## 2023-03-27 ASSESSMENT — ENCOUNTER SYMPTOMS
RESPIRATORY NEGATIVE: 1
GASTROINTESTINAL NEGATIVE: 1
ALLERGIC/IMMUNOLOGIC NEGATIVE: 1
EYES NEGATIVE: 1

## 2023-03-27 NOTE — PROGRESS NOTES
and reactive   Cranial nerve 5 . Normal strength of masseter and temporalis . Intact corneal reflex. Normal facial sensation  Cranial nerve 7 normal exam   Cranial nerve 8. Grossly intact hearing   Cranial nerve 9 and 10. Symmetric palate elevation   Cranial nerve 11 , 5 out of 5 strength   Cranial Nerve 12 midline tongue . No atrophy  Sensation . Normal proprioception . Intact Vibration . Normal pinprick and light touch   Deep Tendon Reflexes normal  Plantar response flexor bilaterally      ASSESSMENT/PLAN      Diagnosis Orders   1. Brainstem infarction ([address]        2. Atrial fibrillation, unspecified type ([address]        3. Obstructive sleep apnea syndrome        4. Hypertension, unspecified type        He is to remain on eliquis and lipitor .  For migraine abortive he is to go on nurtec 75 mg PRN       As above
(0) independent

## 2023-04-21 ENCOUNTER — TELEPHONE (OUTPATIENT)
Dept: NEUROLOGY | Age: 53
End: 2023-04-21

## 2023-07-03 ENCOUNTER — TELEPHONE (OUTPATIENT)
Dept: NEUROLOGY | Age: 53
End: 2023-07-03
